# Patient Record
Sex: FEMALE | Race: ASIAN | NOT HISPANIC OR LATINO | Employment: UNEMPLOYED | URBAN - METROPOLITAN AREA
[De-identification: names, ages, dates, MRNs, and addresses within clinical notes are randomized per-mention and may not be internally consistent; named-entity substitution may affect disease eponyms.]

---
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
## 2022-02-11 ENCOUNTER — LAB REQUISITION (OUTPATIENT)
Dept: LAB | Facility: CLINIC | Age: 22
End: 2022-02-11

## 2022-02-11 DIAGNOSIS — R30.0 DYSURIA: ICD-10-CM

## 2022-02-11 LAB
ALBUMIN UR-MCNC: NEGATIVE MG/DL
APPEARANCE UR: CLEAR
BACTERIA #/AREA URNS HPF: ABNORMAL /HPF
BILIRUB UR QL STRIP: NEGATIVE
COLOR UR AUTO: ABNORMAL
GLUCOSE UR STRIP-MCNC: NEGATIVE MG/DL
HGB UR QL STRIP: ABNORMAL
KETONES UR STRIP-MCNC: NEGATIVE MG/DL
LEUKOCYTE ESTERASE UR QL STRIP: NEGATIVE
NITRATE UR QL: NEGATIVE
PH UR STRIP: 6.5 [PH] (ref 5–7)
RBC URINE: 1 /HPF
SP GR UR STRIP: 1.01 (ref 1–1.03)
SQUAMOUS EPITHELIAL: <1 /HPF
UROBILINOGEN UR STRIP-MCNC: <2 MG/DL
WBC URINE: 1 /HPF

## 2022-02-11 PROCEDURE — 87186 SC STD MICRODIL/AGAR DIL: CPT | Performed by: FAMILY MEDICINE

## 2022-02-11 PROCEDURE — 81001 URINALYSIS AUTO W/SCOPE: CPT | Performed by: FAMILY MEDICINE

## 2022-02-14 LAB — BACTERIA UR CULT: ABNORMAL

## 2022-08-04 ENCOUNTER — LAB (OUTPATIENT)
Dept: LAB | Facility: HOSPITAL | Age: 22
End: 2022-08-04
Payer: COMMERCIAL

## 2022-08-04 DIAGNOSIS — H30.91 RIGHT POSTERIOR UVEITIS: Primary | ICD-10-CM

## 2022-08-04 LAB
BASOPHILS # BLD AUTO: 0.1 10E3/UL (ref 0–0.2)
BASOPHILS NFR BLD AUTO: 1 %
C REACTIVE PROTEIN LHE: <0.1 MG/DL (ref 0–?)
EOSINOPHIL # BLD AUTO: 0.3 10E3/UL (ref 0–0.7)
EOSINOPHIL NFR BLD AUTO: 5 %
ERYTHROCYTE [DISTWIDTH] IN BLOOD BY AUTOMATED COUNT: 12.3 % (ref 10–15)
ERYTHROCYTE [SEDIMENTATION RATE] IN BLOOD BY WESTERGREN METHOD: 13 MM/HR (ref 0–20)
HCT VFR BLD AUTO: 42.8 % (ref 35–47)
HGB BLD-MCNC: 14.2 G/DL (ref 11.7–15.7)
IMM GRANULOCYTES # BLD: 0 10E3/UL
IMM GRANULOCYTES NFR BLD: 0 %
LYMPHOCYTES # BLD AUTO: 2.1 10E3/UL (ref 0.8–5.3)
LYMPHOCYTES NFR BLD AUTO: 33 %
MCH RBC QN AUTO: 30.4 PG (ref 26.5–33)
MCHC RBC AUTO-ENTMCNC: 33.2 G/DL (ref 31.5–36.5)
MCV RBC AUTO: 92 FL (ref 78–100)
MONOCYTES # BLD AUTO: 0.8 10E3/UL (ref 0–1.3)
MONOCYTES NFR BLD AUTO: 12 %
NEUTROPHILS # BLD AUTO: 3.2 10E3/UL (ref 1.6–8.3)
NEUTROPHILS NFR BLD AUTO: 49 %
NRBC # BLD AUTO: 0 10E3/UL
NRBC BLD AUTO-RTO: 0 /100
PLATELET # BLD AUTO: 284 10E3/UL (ref 150–450)
RBC # BLD AUTO: 4.67 10E6/UL (ref 3.8–5.2)
WBC # BLD AUTO: 6.5 10E3/UL (ref 4–11)

## 2022-08-04 PROCEDURE — 86777 TOXOPLASMA ANTIBODY: CPT

## 2022-08-04 PROCEDURE — 86038 ANTINUCLEAR ANTIBODIES: CPT

## 2022-08-04 PROCEDURE — 86780 TREPONEMA PALLIDUM: CPT

## 2022-08-04 PROCEDURE — 82164 ANGIOTENSIN I ENZYME TEST: CPT

## 2022-08-04 PROCEDURE — 86481 TB AG RESPONSE T-CELL SUSP: CPT

## 2022-08-04 PROCEDURE — 86611 BARTONELLA ANTIBODY: CPT

## 2022-08-04 PROCEDURE — 85025 COMPLETE CBC W/AUTO DIFF WBC: CPT

## 2022-08-04 PROCEDURE — 81374 HLA I TYPING 1 ANTIGEN LR: CPT

## 2022-08-04 PROCEDURE — 85549 MURAMIDASE: CPT

## 2022-08-04 PROCEDURE — 86140 C-REACTIVE PROTEIN: CPT

## 2022-08-04 PROCEDURE — 36415 COLL VENOUS BLD VENIPUNCTURE: CPT

## 2022-08-04 PROCEDURE — 86036 ANCA SCREEN EACH ANTIBODY: CPT

## 2022-08-04 PROCEDURE — 86431 RHEUMATOID FACTOR QUANT: CPT

## 2022-08-04 PROCEDURE — 85652 RBC SED RATE AUTOMATED: CPT

## 2022-08-04 PROCEDURE — 86618 LYME DISEASE ANTIBODY: CPT

## 2022-08-05 LAB
B BURGDOR IGG+IGM SER QL: 0.3
RHEUMATOID FACT SER NEPH-ACNC: <6 IU/ML
T GONDII IGG SER QL: 84.7 IU/ML (ref 0–7.1)
T PALLIDUM AB SER QL: NONREACTIVE

## 2022-08-06 LAB
ACE SERPL-CCNC: 48 U/L
QUANTIFERON MITOGEN: 10 IU/ML
QUANTIFERON NIL TUBE: 0.32 IU/ML
QUANTIFERON TB1 TUBE: 0.17 IU/ML
QUANTIFERON TB2 TUBE: 0.46

## 2022-08-07 LAB
GAMMA INTERFERON BACKGROUND BLD IA-ACNC: 0.32 IU/ML
M TB IFN-G BLD-IMP: NEGATIVE
M TB IFN-G CD4+ BCKGRND COR BLD-ACNC: 9.68 IU/ML
MITOGEN IGNF BCKGRD COR BLD-ACNC: -0.15 IU/ML
MITOGEN IGNF BCKGRD COR BLD-ACNC: 0.14 IU/ML

## 2022-08-08 LAB
ANA SER QL IF: NEGATIVE
B HENSELAE IGG TITR SER IF: NORMAL {TITER}
B HENSELAE IGM TITR SER IF: NORMAL {TITER}

## 2022-08-09 LAB
ANCA AB PATTERN SER IF-IMP: NORMAL
C-ANCA TITR SER IF: NORMAL {TITER}

## 2022-08-10 LAB
B LOCUS: NORMAL
B27TEST METHOD: NORMAL

## 2022-08-11 LAB — LYSOZYME SERPL-MCNC: 1.66 UG/ML

## 2022-11-10 ENCOUNTER — LAB REQUISITION (OUTPATIENT)
Dept: LAB | Facility: CLINIC | Age: 22
End: 2022-11-10

## 2022-11-10 DIAGNOSIS — Z32.01 ENCOUNTER FOR PREGNANCY TEST, RESULT POSITIVE: ICD-10-CM

## 2022-11-10 LAB
BASOPHILS # BLD AUTO: 0.1 10E3/UL (ref 0–0.2)
BASOPHILS NFR BLD AUTO: 1 %
EOSINOPHIL # BLD AUTO: 0.2 10E3/UL (ref 0–0.7)
EOSINOPHIL NFR BLD AUTO: 1 %
ERYTHROCYTE [DISTWIDTH] IN BLOOD BY AUTOMATED COUNT: 12.3 % (ref 10–15)
HCT VFR BLD AUTO: 40.2 % (ref 35–47)
HGB BLD-MCNC: 13.3 G/DL (ref 11.7–15.7)
IMM GRANULOCYTES # BLD: 0.1 10E3/UL
IMM GRANULOCYTES NFR BLD: 0 %
LYMPHOCYTES # BLD AUTO: 2 10E3/UL (ref 0.8–5.3)
LYMPHOCYTES NFR BLD AUTO: 17 %
MCH RBC QN AUTO: 30.3 PG (ref 26.5–33)
MCHC RBC AUTO-ENTMCNC: 33.1 G/DL (ref 31.5–36.5)
MCV RBC AUTO: 92 FL (ref 78–100)
MONOCYTES # BLD AUTO: 0.8 10E3/UL (ref 0–1.3)
MONOCYTES NFR BLD AUTO: 7 %
NEUTROPHILS # BLD AUTO: 8.5 10E3/UL (ref 1.6–8.3)
NEUTROPHILS NFR BLD AUTO: 74 %
NRBC # BLD AUTO: 0 10E3/UL
NRBC BLD AUTO-RTO: 0 /100
PLATELET # BLD AUTO: 308 10E3/UL (ref 150–450)
RBC # BLD AUTO: 4.39 10E6/UL (ref 3.8–5.2)
WBC # BLD AUTO: 11.5 10E3/UL (ref 4–11)

## 2022-11-10 PROCEDURE — 86803 HEPATITIS C AB TEST: CPT | Performed by: NURSE PRACTITIONER

## 2022-11-10 PROCEDURE — 86901 BLOOD TYPING SEROLOGIC RH(D): CPT | Performed by: NURSE PRACTITIONER

## 2022-11-10 PROCEDURE — 86780 TREPONEMA PALLIDUM: CPT | Performed by: NURSE PRACTITIONER

## 2022-11-10 PROCEDURE — 85014 HEMATOCRIT: CPT | Performed by: NURSE PRACTITIONER

## 2022-11-10 PROCEDURE — 86762 RUBELLA ANTIBODY: CPT | Performed by: NURSE PRACTITIONER

## 2022-11-10 PROCEDURE — 87088 URINE BACTERIA CULTURE: CPT | Performed by: NURSE PRACTITIONER

## 2022-11-10 PROCEDURE — 87491 CHLMYD TRACH DNA AMP PROBE: CPT | Performed by: NURSE PRACTITIONER

## 2022-11-10 PROCEDURE — 87389 HIV-1 AG W/HIV-1&-2 AB AG IA: CPT | Performed by: NURSE PRACTITIONER

## 2022-11-10 PROCEDURE — 87340 HEPATITIS B SURFACE AG IA: CPT | Performed by: NURSE PRACTITIONER

## 2022-11-11 LAB
ABO/RH(D): NORMAL
ANTIBODY SCREEN: NEGATIVE
C TRACH DNA SPEC QL PROBE+SIG AMP: NEGATIVE
HBV SURFACE AG SERPL QL IA: NONREACTIVE
HCV AB SERPL QL IA: NONREACTIVE
HIV 1+2 AB+HIV1 P24 AG SERPL QL IA: NONREACTIVE
N GONORRHOEA DNA SPEC QL NAA+PROBE: NEGATIVE
RUBV IGG SERPL QL IA: 14.3 INDEX
RUBV IGG SERPL QL IA: POSITIVE
SPECIMEN EXPIRATION DATE: NORMAL
T PALLIDUM AB SER QL: NONREACTIVE

## 2022-11-13 LAB — BACTERIA UR CULT: ABNORMAL

## 2022-12-30 ENCOUNTER — LAB REQUISITION (OUTPATIENT)
Dept: LAB | Facility: CLINIC | Age: 22
End: 2022-12-30

## 2022-12-30 DIAGNOSIS — Z34.01 ENCOUNTER FOR SUPERVISION OF NORMAL FIRST PREGNANCY, FIRST TRIMESTER: ICD-10-CM

## 2022-12-30 PROCEDURE — G0145 SCR C/V CYTO,THINLAYER,RESCR: HCPCS | Performed by: FAMILY MEDICINE

## 2023-01-03 LAB
BKR LAB AP GYN ADEQUACY: NORMAL
BKR LAB AP GYN INTERPRETATION: NORMAL
BKR LAB AP HPV REFLEX: NORMAL
BKR LAB AP LMP: NORMAL
BKR LAB AP PREVIOUS ABNORMAL: NORMAL
PATH REPORT.COMMENTS IMP SPEC: NORMAL
PATH REPORT.COMMENTS IMP SPEC: NORMAL
PATH REPORT.RELEVANT HX SPEC: NORMAL

## 2023-03-31 ENCOUNTER — LAB REQUISITION (OUTPATIENT)
Dept: LAB | Facility: CLINIC | Age: 23
End: 2023-03-31

## 2023-03-31 DIAGNOSIS — Z34.00 ENCOUNTER FOR SUPERVISION OF NORMAL FIRST PREGNANCY, UNSPECIFIED TRIMESTER: ICD-10-CM

## 2023-03-31 LAB — HGB BLD-MCNC: 12.3 G/DL (ref 11.7–15.7)

## 2023-03-31 PROCEDURE — 86780 TREPONEMA PALLIDUM: CPT | Performed by: FAMILY MEDICINE

## 2023-03-31 PROCEDURE — 85018 HEMOGLOBIN: CPT | Performed by: FAMILY MEDICINE

## 2023-04-01 LAB — T PALLIDUM AB SER QL: NONREACTIVE

## 2023-05-16 ENCOUNTER — LAB REQUISITION (OUTPATIENT)
Dept: LAB | Facility: CLINIC | Age: 23
End: 2023-05-16

## 2023-05-16 ENCOUNTER — TRANSFERRED RECORDS (OUTPATIENT)
Dept: HEALTH INFORMATION MANAGEMENT | Facility: CLINIC | Age: 23
End: 2023-05-16
Payer: COMMERCIAL

## 2023-05-16 DIAGNOSIS — Z34.00 ENCOUNTER FOR SUPERVISION OF NORMAL FIRST PREGNANCY, UNSPECIFIED TRIMESTER: ICD-10-CM

## 2023-05-16 PROCEDURE — 87653 STREP B DNA AMP PROBE: CPT | Performed by: FAMILY MEDICINE

## 2023-05-18 LAB — GP B STREP DNA SPEC QL NAA+PROBE: NEGATIVE

## 2023-06-03 ENCOUNTER — HOSPITAL ENCOUNTER (INPATIENT)
Facility: HOSPITAL | Age: 23
LOS: 1 days | Discharge: HOME | End: 2023-06-05
Attending: FAMILY MEDICINE | Admitting: FAMILY MEDICINE
Payer: COMMERCIAL

## 2023-06-03 ENCOUNTER — HOSPITAL ENCOUNTER (OUTPATIENT)
Facility: HOSPITAL | Age: 23
Discharge: HOME | End: 2023-06-03
Attending: FAMILY MEDICINE | Admitting: FAMILY MEDICINE
Payer: COMMERCIAL

## 2023-06-03 ENCOUNTER — HOSPITAL ENCOUNTER (OUTPATIENT)
Facility: HOSPITAL | Age: 23
End: 2023-06-03
Admitting: FAMILY MEDICINE

## 2023-06-03 VITALS — RESPIRATION RATE: 16 BRPM | SYSTOLIC BLOOD PRESSURE: 119 MMHG | DIASTOLIC BLOOD PRESSURE: 84 MMHG | TEMPERATURE: 97.8 F

## 2023-06-03 PROBLEM — Z36.89 ENCOUNTER FOR TRIAGE IN PREGNANT PATIENT: Status: ACTIVE | Noted: 2023-06-03

## 2023-06-03 PROBLEM — O47.9 UTERINE CONTRACTIONS: Status: ACTIVE | Noted: 2023-06-03

## 2023-06-03 PROCEDURE — 96372 THER/PROPH/DIAG INJ SC/IM: CPT | Performed by: FAMILY MEDICINE

## 2023-06-03 PROCEDURE — G0463 HOSPITAL OUTPT CLINIC VISIT: HCPCS

## 2023-06-03 PROCEDURE — 250N000011 HC RX IP 250 OP 636: Performed by: FAMILY MEDICINE

## 2023-06-03 PROCEDURE — 250N000013 HC RX MED GY IP 250 OP 250 PS 637: Performed by: FAMILY MEDICINE

## 2023-06-03 RX ORDER — ONDANSETRON 4 MG/1
4 TABLET, ORALLY DISINTEGRATING ORAL EVERY 6 HOURS PRN
Status: DISCONTINUED | OUTPATIENT
Start: 2023-06-03 | End: 2023-06-04 | Stop reason: HOSPADM

## 2023-06-03 RX ORDER — PROCHLORPERAZINE MALEATE 10 MG
10 TABLET ORAL EVERY 6 HOURS PRN
Status: DISCONTINUED | OUTPATIENT
Start: 2023-06-03 | End: 2023-06-04 | Stop reason: HOSPADM

## 2023-06-03 RX ORDER — ONDANSETRON 2 MG/ML
4 INJECTION INTRAMUSCULAR; INTRAVENOUS EVERY 6 HOURS PRN
Status: DISCONTINUED | OUTPATIENT
Start: 2023-06-03 | End: 2023-06-04 | Stop reason: HOSPADM

## 2023-06-03 RX ORDER — HYDROXYZINE HYDROCHLORIDE 50 MG/1
50-100 TABLET, FILM COATED ORAL
Status: DISPENSED | OUTPATIENT
Start: 2023-06-03 | End: 2023-06-04

## 2023-06-03 RX ORDER — METOCLOPRAMIDE HYDROCHLORIDE 5 MG/ML
10 INJECTION INTRAMUSCULAR; INTRAVENOUS EVERY 6 HOURS PRN
Status: DISCONTINUED | OUTPATIENT
Start: 2023-06-03 | End: 2023-06-04 | Stop reason: HOSPADM

## 2023-06-03 RX ORDER — PROCHLORPERAZINE 25 MG
25 SUPPOSITORY, RECTAL RECTAL EVERY 12 HOURS PRN
Status: DISCONTINUED | OUTPATIENT
Start: 2023-06-03 | End: 2023-06-04 | Stop reason: HOSPADM

## 2023-06-03 RX ORDER — LIDOCAINE 40 MG/G
CREAM TOPICAL
Status: DISCONTINUED | OUTPATIENT
Start: 2023-06-03 | End: 2023-06-03 | Stop reason: HOSPADM

## 2023-06-03 RX ORDER — METOCLOPRAMIDE 10 MG/1
10 TABLET ORAL EVERY 6 HOURS PRN
Status: DISCONTINUED | OUTPATIENT
Start: 2023-06-03 | End: 2023-06-04 | Stop reason: HOSPADM

## 2023-06-03 RX ORDER — MORPHINE SULFATE 10 MG/ML
10 INJECTION, SOLUTION INTRAMUSCULAR; INTRAVENOUS ONCE
Status: COMPLETED | OUTPATIENT
Start: 2023-06-03 | End: 2023-06-03

## 2023-06-03 RX ADMIN — HYDROXYZINE HYDROCHLORIDE 100 MG: 50 TABLET, FILM COATED ORAL at 23:52

## 2023-06-03 RX ADMIN — MORPHINE SULFATE 10 MG: 10 INJECTION INTRAVENOUS at 23:52

## 2023-06-03 ASSESSMENT — ACTIVITIES OF DAILY LIVING (ADL)
ADLS_ACUITY_SCORE: 35

## 2023-06-03 NOTE — PROGRESS NOTES
"Data: Patient assessed in the [facility] for uterine contractions. Cervix 3 cm dilated and 70% effaced. Fetal station -1. no cervical change in over 2 hours. Membranes intact. Patient reports decrease in frequency of contractions since hydrating in hospital.  She reports 2 contractions in last 45 minutes. See flowsheets for fetal assessment documentation.     Action:  Presumed adequate fetal oxygenation documented. Early labor precautions and discharge instructions reviewed, including when to notify provider if warning signs present. Patient instructed to report decrease in fetal movement, vaginal bleeding, changes in membrane status, abdominal pain, or any concerns related to the pregnancy to patient's provider/clinic.     Response: Orders to discharge home per Dr. Zurita. Plan for patient is to re-evaluate labor status by following up with provider at next scheduled appointment, or sooner in hospital as needed. Patient verbalized understanding of education and agreement with plan. \"I'm ready to go home and take a nap.\" Discharged to home at 1345.                                 "

## 2023-06-03 NOTE — PROGRESS NOTES
Patient is GBS negative and blood type O positive per Dr. Allan. Will get a prenatal record from Dr. Allan when able.

## 2023-06-03 NOTE — DISCHARGE INSTRUCTIONS
EARLY LABOR DISCHARGE INSTRUCTIONS    You were seen for: Labor Assessment  You had (Test or Medicine): fetal monitoring and cervical exams    Refer to Any Day Now handout for tips on how to labor at home    WHEN TO CALL YOUR PROVIDER:  If this is your first baby: Your contractions (tightening) are 5 minutes apart, last more than 1 minute, and have been consistently getting stronger for 1 hour or more  If this is your second baby or beyond: Your contractions are less than 10 minutes apart and have been consistently getting stronger for 1 hour or more  Feeling your baby move less than usual  Temperature of 100.4 F (38 C) or higher  New fluid leaking from your vagina  Other signs your provider asked you to look for in your body  Vaginal bleeding (bright red blood)  Swelling in your face or more swelling in your hands or legs  Headaches that don't get better after taking Tylenol (acetaminophen)  Changes in your vision (blurry or seeing spots or stars)  Nausea (sick to your stomach) and vomiting (throwing up)  Heartburn that doesn't go away  Sudden, bad belly pain that is unlike your contractions    IF YOU ARRIVED WITH YOUR WATER ALREADY BROKEN (MEMBRANES RUPTURED):  Avoid placing anything in vagina, including intercourse (sex)  Check your temperature every 3 hours when awake  Call your provider/clinic if:  Your temperature is 100.4 F (38 C) or higher  Your fluid becomes not clear or is smelly  Your baby is moving less than usual  You don't go into labor within 24 hours of your water breaking  You have other concerns  Follow up plan: At next scheduled appointment or sooner in hospital for labor evaluation      FOLLOW UP:  As scheduled in the clinic    Provider/clinic number: [phone] [facility]      Any Day Now  Your guide to early labor at home  Congratulations; you're in early labor! This is an early stage of labor that helps prepare your body for active labor--the phase when you finally meet your baby.  "  About two-thirds of your entire labor (about 66%) will be in this early stage of labor. If this is your first time in labor, this phase may last 20 hours or more. It may be shorter for people who have been in labor before.   What should I do?  We understand that you have been waiting a long time to meet your baby and that waiting a bit more seems like forever. We suggest spending your early labor at home. Here's why:  You can be comfortable in your own surroundings.  You can relax, which will help your labor progress.  It may make the time seem to go by faster.  Together, we will create a plan for when to come to the hospital or follow-up with your health care provider.  We realize that this can be a time of uncertainty, anxiety, and mixed emotions (on top of not being very restful). We hope the suggestions in this document will make your early labor a bit more comfortable and may even help speed up the process.  What should I know about early labor?  Early labor is the first stage of labor. In this stage, your uterus begins having contractions to prepare for childbirth. When you have a contraction, the muscles of the uterus tighten and relax. Contractions help your cervix to thin and shorten (efface) and open (dilate) so your baby can be born.   Labor contractions increase steadily over time. They become stronger and more frequent until they are happening every 5 minutes or more.  You may have already had some \"practice\" contractions called [place]. While these contractions may be strong and sometimes uncomfortable, they are not true labor contractions. [place] contractions don't help your cervix dilate the way that labor contractions do  Words you'll hear  Cervix - the opening to the uterus. The cervix must be fully dilated (open) for your baby to be born. The cervix is in the back of the vagina.  Contractions - when the muscles of the uterus tighten and relax.   Dilated -the openness of your cervix; " it is measured in centimeters from 0 to 10.  Effaced - shortening and thinning of the cervix; it is measured from 0 to 100%.   Uterus - where your baby is located.   How does labor typcally progress?  The amount of dilation in your cervix lets us know how close you are to delivery. Labor often progresses like this:  0 centimeters: The cervix is closed.  1 to 5 centimeters: You start having regular contractions to dilate your cervix (early labor). This slowly prepares your body for childbirth.  6 centimeters: This is when active labor begins. Contractions happen in a regular pattern that increases steadily over time.  10 centimeters: Your cervix is completely dilated. You are ready to start pushing.     What can I do to help my labor progress?  Diet  It is important to stay hydrated, especially with water, broths, ice pops (Popsicles).  Eat light snacks or meals you find comforting.  As your body transitions into active labor, you will likely be less hungry. You may also feel nauseous (sick to your stomach). Eat light meals that you feel you can tolerate.  Keep your energy up with good nutrition, including fruits and vegetables.  Activity  It is important to rest as your body allows. Talk to your health care team if you find it hard to sleep or rest.  You should continue to feel your baby move. You can expect to feel 10 movements each hour.  If your water breaks, avoid sexual intercourse or putting anything into your vagina.  Positions to try  Changing your position often may help your labor to progress and feel more comfortable.  Abdominal tilt     Tilt your hips forward. Use your hands to gently lift your belly (or ask a support person). By lifting your belly and tilting your hips forward, you are creating a straighter line for your baby to come down into your pelvis.   Side-lying resting     Lie on your side and pull your top leg up and over to a 90-degree angle, if possible. This allows a comfortable position for  you to rest and your pelvis to open up. Try this position on your left side, and then your right side.   Exercise/birthing ball     A large exercise ball gives you a break from standing, plus it allows movement and lets gravity do its job. Rocking or swaying on the ball allows you to be upright so your baby can come down into your pelvis.   Hands and knees  You can also try positioning yourself on your hands and knees, with or without the ball for support.     How can my support person help me?  Talk to your support person ahead of time about ways to help you during labor. For example, your support person can:  Review different positions and comfort measures with you ahead of time. This will help you feel more prepared.  Make you healthy snacks ahead of time so you can keep your energy up.  Encourage you through breathing and relaxation techniques. This will help you stay focused.  Finding comfort during labor  Some ideas to help refocus, calm anxiety, and relax tense muscles:  Listen to soft music, watch a movie, or visit social medial (such as [employer]).  Use meditation or guided imagery to create pictures or stories in your mind.  Walk around.  Take a warm bath or shower.  Try rhythmic movement, like slow dancing or using a rocking chair.  Try massaging touch to your comfort level on the hips, lower back or feet. You can also try different pressure levels (firm pressure is safe) or vibration.  Deep breathing (may be called the 4-7-8 technique, square breathing, or relaxing breath).  Aromatherapy (peppermint, lavender and citrus are great choices for nausea and relaxation).  Wear a belly support band.  Wrap a hot/cold pack in a towel to protect your skin. Apply cold packs to your lower back or pulse points. Or, use heat on your lower back. Leave the hot/cold pack on for 30 minutes or less.  For helpful videos on comfort during labor, please visit https://[email or web address].com/category-pain-management-series.  How  will I know when I'm in active labor?  Start timing your contractions when they become stronger or more intense. Time your contractions from the start of one contraction until the start of another.  Signs of active labor  If this is your first baby:  Your contractions are 5 minutes apart; and  Last more than 1 minute; and  Have been consistently getting stronger   for 1 hour or more.  If this is your second baby or beyond:  Your contractions are less than 10 minutes apart; and  Have been consistently getting stronger   for 1 hour or more.  When to call your provider:  Call your provider right away if you have any of these issues:  You have any signs of active labor previously listed.  Bright red bleeding in your underwear.  You think your water has broken.  Your temperature 100.4 F (38 C) or higher.  If you are less than 34 weeks:  More than 6 contractions in one hour  Follow-up visits  Please keep your regularly scheduled clinic appointment with your pregnancy provider.  For informational purposes only. Not to replace the advice of your health care provider. Cervical effacement and dilation image ID [id number]   Luna Contreras  [employer]. All rights reserved. Text and other images copyright   2022 [employer]. All rights reserved. Clinically reviewed by the Safe Vaginal Birth Steering Team. [place] [id number] - 03/23.

## 2023-06-03 NOTE — PROGRESS NOTES
Data: [facility] presented [facility]: 6/3/2023 10:20 AM.  Reason for maternal/fetal assessment is uterine contractions. Patient reports contractions every 7 minutes since around 0800. Patient denies leaking of vaginal fluid/rupture of membranes, vaginal bleeding, nausea, vomiting, headache, visual disturbances, epigastric or RUQ pain, significant edema. Patient reports fetal movement is normal. Patient is a Unknown . Prenatal record reviewed. Pregnancy has been uncomplicated. Support person Cuate Grullon -  -  is present.     Fetal HR baseline was 135 , variability is moderate  . Accelerations: present  . Decelerations: absent  . Uterine assessment is  Soft between contractions and during palpates moderate during contractions. Cervix 3 cm dilated and 70% effaced. Fetal station -1. Fetal presentation vertex  per cervical exam. Membranes: intact.    Vital signs wnl. Patient reports pain and is coping.     Action: Verbal consent for EFM. Triage assessment completed. Patient may meet criteria for early labor discharge.     Response: Patient verbalized understanding of triage assessment. Dr. Allan contacted with assessment and consideration of early labor discharge vs admission. Ok per Dr. Allan to offer patient early labor discharge or continuing to monitor labor here.  Patient chooses to stay and recheck cervix in a couple of hours.  Dr. Allan updated. Category 1 tracing.  OK to continue with intermittent auscultation per policy at this time.  Patient encouraged to explore different upright positions for progression of labor.  Comfortable on the birthing ball.  PO fluids encouraged.  Educated on call light.  No questions or concerns at this time.

## 2023-06-04 ENCOUNTER — APPOINTMENT (OUTPATIENT)
Dept: RADIOLOGY | Facility: HOSPITAL | Age: 23
End: 2023-06-04
Attending: FAMILY MEDICINE
Payer: COMMERCIAL

## 2023-06-04 PROBLEM — Z34.90 PREGNANT: Status: ACTIVE | Noted: 2023-06-04

## 2023-06-04 LAB
ABO/RH(D): NORMAL
ANTIBODY SCREEN: NEGATIVE
HOLD SPECIMEN: NORMAL
SPECIMEN EXPIRATION DATE: NORMAL
T PALLIDUM AB SER QL: NONREACTIVE

## 2023-06-04 PROCEDURE — 120N000001 HC R&B MED SURG/OB

## 2023-06-04 PROCEDURE — 250N000009 HC RX 250: Performed by: FAMILY MEDICINE

## 2023-06-04 PROCEDURE — 86850 RBC ANTIBODY SCREEN: CPT | Performed by: FAMILY MEDICINE

## 2023-06-04 PROCEDURE — 250N000011 HC RX IP 250 OP 636: Performed by: FAMILY MEDICINE

## 2023-06-04 PROCEDURE — 86780 TREPONEMA PALLIDUM: CPT | Performed by: FAMILY MEDICINE

## 2023-06-04 PROCEDURE — 72170 X-RAY EXAM OF PELVIS: CPT

## 2023-06-04 PROCEDURE — 36415 COLL VENOUS BLD VENIPUNCTURE: CPT | Performed by: FAMILY MEDICINE

## 2023-06-04 PROCEDURE — 722N000001 HC LABOR CARE VAGINAL DELIVERY SINGLE

## 2023-06-04 PROCEDURE — 250N000013 HC RX MED GY IP 250 OP 250 PS 637: Performed by: FAMILY MEDICINE

## 2023-06-04 PROCEDURE — 86901 BLOOD TYPING SEROLOGIC RH(D): CPT | Performed by: FAMILY MEDICINE

## 2023-06-04 RX ORDER — MISOPROSTOL 200 UG/1
800 TABLET ORAL
Status: DISCONTINUED | OUTPATIENT
Start: 2023-06-04 | End: 2023-06-04 | Stop reason: HOSPADM

## 2023-06-04 RX ORDER — NALOXONE HYDROCHLORIDE 0.4 MG/ML
0.2 INJECTION, SOLUTION INTRAMUSCULAR; INTRAVENOUS; SUBCUTANEOUS
Status: DISCONTINUED | OUTPATIENT
Start: 2023-06-04 | End: 2023-06-04 | Stop reason: HOSPADM

## 2023-06-04 RX ORDER — MISOPROSTOL 200 UG/1
800 TABLET ORAL
Status: DISCONTINUED | OUTPATIENT
Start: 2023-06-04 | End: 2023-06-05 | Stop reason: HOSPADM

## 2023-06-04 RX ORDER — PROCHLORPERAZINE MALEATE 10 MG
10 TABLET ORAL EVERY 6 HOURS PRN
Status: DISCONTINUED | OUTPATIENT
Start: 2023-06-04 | End: 2023-06-04 | Stop reason: HOSPADM

## 2023-06-04 RX ORDER — ACETAMINOPHEN 325 MG/1
650 TABLET ORAL EVERY 4 HOURS PRN
Status: DISCONTINUED | OUTPATIENT
Start: 2023-06-04 | End: 2023-06-05 | Stop reason: HOSPADM

## 2023-06-04 RX ORDER — METOCLOPRAMIDE HYDROCHLORIDE 5 MG/ML
10 INJECTION INTRAMUSCULAR; INTRAVENOUS EVERY 6 HOURS PRN
Status: DISCONTINUED | OUTPATIENT
Start: 2023-06-04 | End: 2023-06-04 | Stop reason: HOSPADM

## 2023-06-04 RX ORDER — OXYTOCIN 10 [USP'U]/ML
10 INJECTION, SOLUTION INTRAMUSCULAR; INTRAVENOUS
Status: DISCONTINUED | OUTPATIENT
Start: 2023-06-04 | End: 2023-06-05 | Stop reason: HOSPADM

## 2023-06-04 RX ORDER — OXYTOCIN/0.9 % SODIUM CHLORIDE 30/500 ML
100-340 PLASTIC BAG, INJECTION (ML) INTRAVENOUS CONTINUOUS PRN
Status: DISCONTINUED | OUTPATIENT
Start: 2023-06-04 | End: 2023-06-05 | Stop reason: HOSPADM

## 2023-06-04 RX ORDER — OXYTOCIN/0.9 % SODIUM CHLORIDE 30/500 ML
340 PLASTIC BAG, INJECTION (ML) INTRAVENOUS CONTINUOUS PRN
Status: DISCONTINUED | OUTPATIENT
Start: 2023-06-04 | End: 2023-06-05 | Stop reason: HOSPADM

## 2023-06-04 RX ORDER — NALOXONE HYDROCHLORIDE 0.4 MG/ML
0.4 INJECTION, SOLUTION INTRAMUSCULAR; INTRAVENOUS; SUBCUTANEOUS
Status: DISCONTINUED | OUTPATIENT
Start: 2023-06-04 | End: 2023-06-04 | Stop reason: HOSPADM

## 2023-06-04 RX ORDER — ONDANSETRON 2 MG/ML
4 INJECTION INTRAMUSCULAR; INTRAVENOUS EVERY 6 HOURS PRN
Status: DISCONTINUED | OUTPATIENT
Start: 2023-06-04 | End: 2023-06-04 | Stop reason: HOSPADM

## 2023-06-04 RX ORDER — MODIFIED LANOLIN
OINTMENT (GRAM) TOPICAL
Status: DISCONTINUED | OUTPATIENT
Start: 2023-06-04 | End: 2023-06-05 | Stop reason: HOSPADM

## 2023-06-04 RX ORDER — DOCUSATE SODIUM 100 MG/1
100 CAPSULE, LIQUID FILLED ORAL DAILY
Status: DISCONTINUED | OUTPATIENT
Start: 2023-06-04 | End: 2023-06-05 | Stop reason: HOSPADM

## 2023-06-04 RX ORDER — KETOROLAC TROMETHAMINE 30 MG/ML
30 INJECTION, SOLUTION INTRAMUSCULAR; INTRAVENOUS
Status: DISCONTINUED | OUTPATIENT
Start: 2023-06-04 | End: 2023-06-05 | Stop reason: HOSPADM

## 2023-06-04 RX ORDER — CARBOPROST TROMETHAMINE 250 UG/ML
250 INJECTION, SOLUTION INTRAMUSCULAR
Status: DISCONTINUED | OUTPATIENT
Start: 2023-06-04 | End: 2023-06-04 | Stop reason: HOSPADM

## 2023-06-04 RX ORDER — MISOPROSTOL 200 UG/1
400 TABLET ORAL
Status: DISCONTINUED | OUTPATIENT
Start: 2023-06-04 | End: 2023-06-05 | Stop reason: HOSPADM

## 2023-06-04 RX ORDER — HYDROCORTISONE 25 MG/G
CREAM TOPICAL 3 TIMES DAILY PRN
Status: DISCONTINUED | OUTPATIENT
Start: 2023-06-04 | End: 2023-06-05 | Stop reason: HOSPADM

## 2023-06-04 RX ORDER — OXYTOCIN 10 [USP'U]/ML
10 INJECTION, SOLUTION INTRAMUSCULAR; INTRAVENOUS
Status: COMPLETED | OUTPATIENT
Start: 2023-06-04 | End: 2023-06-04

## 2023-06-04 RX ORDER — CARBOPROST TROMETHAMINE 250 UG/ML
250 INJECTION, SOLUTION INTRAMUSCULAR
Status: DISCONTINUED | OUTPATIENT
Start: 2023-06-04 | End: 2023-06-05 | Stop reason: HOSPADM

## 2023-06-04 RX ORDER — PROCHLORPERAZINE 25 MG
25 SUPPOSITORY, RECTAL RECTAL EVERY 12 HOURS PRN
Status: DISCONTINUED | OUTPATIENT
Start: 2023-06-04 | End: 2023-06-04 | Stop reason: HOSPADM

## 2023-06-04 RX ORDER — BISACODYL 10 MG
10 SUPPOSITORY, RECTAL RECTAL DAILY PRN
Status: DISCONTINUED | OUTPATIENT
Start: 2023-06-04 | End: 2023-06-05 | Stop reason: HOSPADM

## 2023-06-04 RX ORDER — METHYLERGONOVINE MALEATE 0.2 MG/ML
200 INJECTION INTRAVENOUS
Status: DISCONTINUED | OUTPATIENT
Start: 2023-06-04 | End: 2023-06-05 | Stop reason: HOSPADM

## 2023-06-04 RX ORDER — CITRIC ACID/SODIUM CITRATE 334-500MG
30 SOLUTION, ORAL ORAL
Status: DISCONTINUED | OUTPATIENT
Start: 2023-06-04 | End: 2023-06-04 | Stop reason: HOSPADM

## 2023-06-04 RX ORDER — METHYLERGONOVINE MALEATE 0.2 MG/ML
200 INJECTION INTRAVENOUS
Status: DISCONTINUED | OUTPATIENT
Start: 2023-06-04 | End: 2023-06-04 | Stop reason: HOSPADM

## 2023-06-04 RX ORDER — METOCLOPRAMIDE 10 MG/1
10 TABLET ORAL EVERY 6 HOURS PRN
Status: DISCONTINUED | OUTPATIENT
Start: 2023-06-04 | End: 2023-06-04 | Stop reason: HOSPADM

## 2023-06-04 RX ORDER — MISOPROSTOL 200 UG/1
400 TABLET ORAL
Status: DISCONTINUED | OUTPATIENT
Start: 2023-06-04 | End: 2023-06-04 | Stop reason: HOSPADM

## 2023-06-04 RX ORDER — IBUPROFEN 800 MG/1
800 TABLET, FILM COATED ORAL EVERY 6 HOURS PRN
Status: DISCONTINUED | OUTPATIENT
Start: 2023-06-04 | End: 2023-06-05 | Stop reason: HOSPADM

## 2023-06-04 RX ORDER — IBUPROFEN 800 MG/1
800 TABLET, FILM COATED ORAL
Status: DISCONTINUED | OUTPATIENT
Start: 2023-06-04 | End: 2023-06-05 | Stop reason: HOSPADM

## 2023-06-04 RX ORDER — ONDANSETRON 4 MG/1
4 TABLET, ORALLY DISINTEGRATING ORAL EVERY 6 HOURS PRN
Status: DISCONTINUED | OUTPATIENT
Start: 2023-06-04 | End: 2023-06-04 | Stop reason: HOSPADM

## 2023-06-04 RX ORDER — FENTANYL CITRATE 50 UG/ML
100 INJECTION, SOLUTION INTRAMUSCULAR; INTRAVENOUS
Status: DISCONTINUED | OUTPATIENT
Start: 2023-06-04 | End: 2023-06-04 | Stop reason: HOSPADM

## 2023-06-04 RX ADMIN — LIDOCAINE HYDROCHLORIDE 20 ML: 10 INJECTION, SOLUTION EPIDURAL; INFILTRATION; INTRACAUDAL; PERINEURAL at 09:01

## 2023-06-04 RX ADMIN — IBUPROFEN 800 MG: 800 TABLET ORAL at 23:11

## 2023-06-04 RX ADMIN — OXYTOCIN 10 UNITS: 10 INJECTION, SOLUTION INTRAMUSCULAR; INTRAVENOUS at 07:59

## 2023-06-04 RX ADMIN — IBUPROFEN 800 MG: 800 TABLET ORAL at 08:54

## 2023-06-04 RX ADMIN — IBUPROFEN 800 MG: 800 TABLET ORAL at 17:24

## 2023-06-04 RX ADMIN — BENZOCAINE AND LEVOMENTHOL: 200; 5 SPRAY TOPICAL at 17:42

## 2023-06-04 RX ADMIN — WITCH HAZEL: 500 SOLUTION RECTAL; TOPICAL at 17:42

## 2023-06-04 RX ADMIN — DOCUSATE SODIUM 100 MG: 100 CAPSULE, LIQUID FILLED ORAL at 11:30

## 2023-06-04 ASSESSMENT — ACTIVITIES OF DAILY LIVING (ADL)
ADLS_ACUITY_SCORE: 18
ADLS_ACUITY_SCORE: 18
WALKING_OR_CLIMBING_STAIRS_DIFFICULTY: NO
ADLS_ACUITY_SCORE: 18
HEARING_DIFFICULTY_OR_DEAF: NO
CONCENTRATING,_REMEMBERING_OR_MAKING_DECISIONS_DIFFICULTY: NO
FALL_HISTORY_WITHIN_LAST_SIX_MONTHS: NO
WEAR_GLASSES_OR_BLIND: NO
ADLS_ACUITY_SCORE: 18
DOING_ERRANDS_INDEPENDENTLY_DIFFICULTY: NO
ADLS_ACUITY_SCORE: 18
ADLS_ACUITY_SCORE: 19
DRESSING/BATHING_DIFFICULTY: NO
DIFFICULTY_EATING/SWALLOWING: NO
ADLS_ACUITY_SCORE: 18
ADLS_ACUITY_SCORE: 18
TOILETING_ISSUES: NO
ADLS_ACUITY_SCORE: 35
ADLS_ACUITY_SCORE: 19
ADLS_ACUITY_SCORE: 18
CHANGE_IN_FUNCTIONAL_STATUS_SINCE_ONSET_OF_CURRENT_ILLNESS/INJURY: NO
ADLS_ACUITY_SCORE: 19
DIFFICULTY_COMMUNICATING: NO

## 2023-06-04 NOTE — PLAN OF CARE
Problem: Plan of Care - These are the overarching goals to be used throughout the patient stay.    Goal: Plan of Care Review  Description: The Plan of Care Review/Shift note should be completed every shift.  The Outcome Evaluation is a brief statement about your assessment that the patient is improving, declining, or no change.  This information will be displayed automatically on your shift note.  Outcome: Progressing     Problem: Postpartum (Vaginal Delivery)  Goal: Optimal Pain Control and Function  Outcome: Progressing  Intervention: Prevent or Manage Pain    Progressing well. Vital signs stable and afebrile. Voided x1 post delivery. Ambulates to bathroom and around room with stand by assist. Fundus firm, light bleeding. No clots noted. Both breastfeeding and formula feeding baby. Reports exhaustion and plans to take a nap this afternoon.

## 2023-06-04 NOTE — L&D DELIVERY NOTE
OB Vaginal Delivery Note    Yefri Rodriguez MRN# [id number]   Age: 23 year old YOB: 2000       GA: 39w4d  GP:   Labor Complications: None   EBL:   mL  Delivery QBL:    Delivery Type: Vaginal, Spontaneous   ROM to Delivery Time: (Delivered) Hours: 3 Minutes: 56  [place] Weight: 3.03 kg (6 lb 10.9 oz)    1 Minute 5 Minute 10 Minute   Apgar Totals: 8   9        NIKHIL SORENSEN;KANDI COBB     Delivery Details:  Yefri Rodriguez, a 23 year old  female delivered a viable infant with apgars of 8  and 9 . Patient was fully dilated and pushing after   hours   minutes in active labor. Delivery was via vaginal, spontaneous  to a sterile field under none  anesthesia. Infant delivered in vertex  left  occiput  anterior  position. Anterior and posterior shoulders delivered without difficulty. The cord was around the baby's neck, and was delivered through, and another body cord was noted.   clamped, cut twice and 3 vessels  were noted. Cord blood was obtained in routine fashion with the following disposition: lab .      Cord complications: none   Placenta delivered at 2023  8:01 AM . Placental disposition was Hospital disposal . Fundal massage performed and fundus found to be firm.     Episiotomy: none    Perineum, vagina, cervix were inspected, and the following lacerations were noted:   Perineal lacerations: 1st     labial laceration: right              Any lacerations were repaired in the usual fashion using 3-0 vicryl.  The noted bilateral periurethral's were not repaired due to excellent hemostasis.    Rectal exam confirmed no extension into the rectum as a button hole was noted just anterior to the perineal muscle.    Ob was called in to make sure the perineal muscle was intact.  A figure of 8 stitch was placed to to help the perineum come together.    No laps were left in the patient.     Excellent hemostasis was noted. Needle count correct. Infant and patient in delivery room in good and stable  "condition.        Saw, Female-Yefri Po [[id number]]    Labor Event Times    Latent labor onset date/time: 6/3/2023 1500    Active labor onset date: 23 Onset time:  4:00 AM CDT   Dilation complete date: 23 Complete time:  7:09 AM   Start pushing date/time: 2023 0709      Labor Events     labor?: No  Labor Type: Spontaneous     Rupture date/time: 23 0400   Rupture type: Spontaneous Rupture of Membranes  Fluid color: Clear  Fluid odor: Normal     Augmentation: None     Delivery/Placenta Date and Time    Delivery Date: 23 Delivery Time:  7:56 AM   Placenta Date/Time: 2023  8:01 AM  Oxytocin given at the time of delivery: after delivery of baby  Delivering clinician: Omaira Allan MD   Other personnel present at delivery:  Provider Role   Nita Blcak RN Registered Nurse   Jumana Yung RN Registered Nurse         Vaginal Counts     Initial count performed by 2 team members:  Two Team Members   gabriele yung       Needles Suture Needles Sponges (RETIRED) Instruments   Initial counts 1 1 5    Added to count       Relief counts       Final counts             Placed during labor Accounted for at the end of labor   FSE NA NA   IUPC NA NA   Cervidil NA NA                     Apgars    Living status: Living   1 Minute 5 Minute 10 Minute 15 Minute 20 Minute   Skin color: 0  1       Heart rate: 2  2       Reflex irritability: 2  2       Muscle tone: 2  2       Respiratory effort: 2  2       Total: 8  9       Apgars assigned by: PETERSON BLACK RN     Cord    Vessels: 3 Vessels    Cord Complications: None   Cord Blood Disposition: Lab      Gases Sent?: No      Delayed cord clamping?: Yes      Cord Clamping Delay (seconds): >120 seconds      Stem cell collection?: No                     [place] Resuscitation    Methods: None     [place] Measurements    Weight: 6 lb 10.9 oz Length: 1' 8.25\"   Head circumference: 31.1 cm       Skin to Skin and Feeding Plan    Skin to skin initiation " date/time: 1841    Skin to skin with: Mother  Skin to skin end date/time: 1841       Labor Events and Shoulder Dystocia    Fetal Tracing Prior to Delivery: Category 2  Fetal Tracing Comments: category 1 until fetal terminal deceleration with    Shoulder dystocia present?: Neg     Delivery (Maternal) (Provider to Complete) (928360)    Episiotomy: None  Perineal lacerations: 1st Repaired?: Yes   Labial laceration: right Repaired?: Yes   Repair suture: 3-0 Vicryl  Number of repair packets: 1  Genital tract inspection done: Pos     Blood Loss  Mother: Yefri Rodriguez Po #[id number]   Start of Mother's Information    Delivery Blood Loss  23 0400 - 23 0840    None           End of Mother's Information  Mother: Yefri Rodriguez Po #[id number]          Delivery - Provider to Complete (203494)    Delivering clinician: Omaira Allan MD  Delivery Type (Choose the 1 that will go to the Birth History): Vaginal, Spontaneous    Other personnel:  Provider Role   Nita Black RN Registered Nurse   Jumana Allison RN Registered Nurse                               Placenta    Date/Time: 2023  8:01 AM  Removal: Spontaneous  Disposition: Hospital disposal           Anesthesia    Method: None                Presentation and Position    Presentation: Vertex    Position: Left Occiput Anterior             baby returned to mom for skin to skin after repair at her request.   1 lap was missing, though no lap were put into the vagina.    Will do an xray to confirm no laps are present.      Omaira Allan MD

## 2023-06-04 NOTE — PROGRESS NOTES
Data: [facility] to [facility]: 6/3/2023  7:37 PM.  Reason for maternal/fetal assessment is uterine contractions. Patient reports contractions getting more stronger since she was here this morning. Patient denies leaking of vaginal fluid/rupture of membranes, vaginal bleeding, abdominal pain, pelvic pressure, nausea, vomiting, headache, visual disturbances, epigastric or RUQ pain, significant edema. Patient reports fetal movement is normal. Patient is a 39w3d . Prenatal record reviewed. Pregnancy has been uncomplicated. Support person is present.     Fetal HR baseline was 130  , variability is moderate with accelerations, no decelerations. Uterine assessment is moderate to strong with palpatin during contractions and  at rest. Cervix 3.5 cm dilated and 90% effaced. Fetal station -2. Fetal presentation cephalic per cervical exam. Membranes: intact.    Vital signs wnl. Patient reports pain and is coping.     Action: Verbal consent for EFM. Triage assessment complete.

## 2023-06-04 NOTE — PROGRESS NOTES
Dr. Allan updated regarding pt's SVE, fetal tracing, contractions.   order received to admit pt and placed intrapartum orders.

## 2023-06-04 NOTE — PROGRESS NOTES
SVE 9cm with bulging bag of water and pt feeling urge to push with contractions. Dr. Allan called to come for delivery.

## 2023-06-04 NOTE — PLAN OF CARE
Goal Outcome Evaluation:      Plan of Care Reviewed With: patient    Overall Patient Progress: improvingOverall Patient Progress: improving         Stable postpartum course - has not voided yet. FFU with scant flow. Bonding well with baby.

## 2023-06-04 NOTE — PROGRESS NOTES
Dr. Allan updated on pt's arrival, fetal tracing, contractions, pain and initial SVE and rechecked SVE.  Pt desires to get some pain relieve and rest. Or received for morphine and atarax. Will update Dr. Allan as needed.

## 2023-06-04 NOTE — H&P
Maternal Admission H&P  [facility]  Date of Admission: 6/3/2023  Date of Service: 2023    Name      Yefri Li Saw         2000  MRN       [id number]  PCP        Omaira Allan at [facility], [phone].    ________________________________________________________________________    Assessment and Plan:  23 year old  at 39w4d.    Baby AGA. Anticipate .    Group B strep: negative    Pt has h/o esbl in urine in 2022.  No subsequent cultures.    ________________________________________________________________________    Chief Complaint: active labor management.    HPI: Yefri Li Saw is a 23 year old woman at 39w4d, based on an Estimated Date of Delivery: 2023. She presents with contractions on Saturday in the am that subsided somewhat, so she went home . She then presented in the evening with ongoing contractions.  She was found to be unchanged, but was carl regularly. She was given morphine and vistaril, and was able to rest a bit and progressed to 7cm.    At the time of my arrival, she was a rim.  She likely ruptured around 4am, but a forebag was noted and this ruptured clear fluid on its own.  She is feeling a very strong urge to push.      Patient Active Problem List    Diagnosis Date Noted     Pregnant 2023     Priority: Medium     Uterine contractions 2023     Priority: Medium      OB History    Para Term  AB Living   1 0 0 0 0 0   SAB IAB Ectopic Multiple Live Births   0 0 0 0 0      # Outcome Date GA Lbr Grey/2nd Weight Sex Delivery Anes PTL Lv   1 Current              Review of Systems Negative except what is noted in HPI.   No past medical history on file.   No past surgical history on file.Patient has no known allergies.  No family history on file.  Social History     Socioeconomic History     Marital status:      Spouse name: Not on file     Number of children: Not on file     Years of  "education: Not on file     Highest education level: Not on file   Occupational History     Not on file   Tobacco Use     Smoking status: Not on file     Smokeless tobacco: Not on file   Vaping Use     Vaping status: Not on file   Substance and Sexual Activity     Alcohol use: Not on file     Drug use: Not on file     Sexual activity: Not on file   Other Topics Concern     Not on file   Social History Narrative     Not on file     Social Determinants of Health     Financial Resource Strain: Not on file   Food Insecurity: Not on file   Transportation Needs: Not on file   Physical Activity: Not on file   Stress: Not on file   Social Connections: Not on file   Intimate Partner Violence: Not on file   Housing Stability: Not on file     Prior to Admission Medication List  No medications prior to admission.      Allergies  No Known Allergies    There is no immunization history on file for this patient.   Physical Exam  Patient Vitals for the past 24 hrs:   BP Temp Temp src Pulse Resp SpO2 Height   06/04/23 0531 (!) 125/90 98  F (36.7  C) Oral -- -- -- --   06/04/23 0430 -- 98.2  F (36.8  C) Oral 96 -- -- --   06/04/23 0332 116/68 -- -- -- -- -- --   06/03/23 2351 103/63 98  F (36.7  C) Oral -- 16 98 % --   06/03/23 2000 122/75 98  F (36.7  C) Oral -- 18 98 % --   06/03/23 1900 -- -- -- -- -- -- 1.499 m (4' 11\")     Wt Readings from Last 1 Encounters:   No data found for Wt   Prepregnancy: Pregravid weight not on file, BMI Could not be calculated. Total gain: Not found. (expected gain: Could not be calculated).    HEART: RRR, no murmur  LUNGS: CTA bilaterally  Fetal Heart Tones: Baseline 120 bpm, variability moderate (6-25 bpm), no decelerations. Reactive. Within the limitations of intermittent auscultation.   CONTRACTIONS:  regular, every 3 minutes.  CERVIX: dilation 9.5 cm , 100% effaced, soft, and 0 station.  FLUID: Clear.  Fetal Presentation: vertex.  Labs    Group B Strep PCR   Date Value Ref Range Status   05/16/2023 " Negative Negative Final     Comment:     Presumed negative for Streptococcus agalactiae (Group B Streptococcus) or the number of organisms may be below the limit of detection of the assay.      Antibody Screen   Date Value Ref Range Status   06/04/2023 Negative Negative Final     Hepatitis B Surface Antigen   Date Value Ref Range Status   11/10/2022 Nonreactive Nonreactive Final     Hemoglobin   Date Value Ref Range Status   03/31/2023 12.3 11.7 - 15.7 g/dL Final      Admission on 06/03/2023   Component Date Value Ref Range Status     ABO/RH(D) 06/04/2023 O POS   Final     Antibody Screen 06/04/2023 Negative  Negative Final     SPECIMEN EXPIRATION DATE 06/04/2023 [id number]   Final        Completed by:   Omaira Allan MD  [facility]  6/4/2023 6:40 AM   used: Not needed.

## 2023-06-04 NOTE — PROGRESS NOTES
Pt moved to room 1 from triage room.  Ирина very 2-6 minutes apart by palpation.   EFM per intermittent ausculation, fetal rate 125-130's, no audible decreases noted, increased present.

## 2023-06-05 VITALS
BODY MASS INDEX: 29.69 KG/M2 | DIASTOLIC BLOOD PRESSURE: 79 MMHG | SYSTOLIC BLOOD PRESSURE: 112 MMHG | HEART RATE: 86 BPM | HEIGHT: 59 IN | WEIGHT: 147.3 LBS | TEMPERATURE: 98.8 F | RESPIRATION RATE: 18 BRPM | OXYGEN SATURATION: 98 %

## 2023-06-05 PROBLEM — O47.9 UTERINE CONTRACTIONS: Status: RESOLVED | Noted: 2023-06-03 | Resolved: 2023-06-05

## 2023-06-05 PROBLEM — Z34.90 PREGNANT: Status: RESOLVED | Noted: 2023-06-04 | Resolved: 2023-06-05

## 2023-06-05 PROCEDURE — 0HQ9XZZ REPAIR PERINEUM SKIN, EXTERNAL APPROACH: ICD-10-PCS | Performed by: FAMILY MEDICINE

## 2023-06-05 PROCEDURE — 250N000013 HC RX MED GY IP 250 OP 250 PS 637: Performed by: FAMILY MEDICINE

## 2023-06-05 RX ORDER — IBUPROFEN 800 MG/1
800 TABLET, FILM COATED ORAL EVERY 6 HOURS PRN
Qty: 30 TABLET | Refills: 0 | Status: SHIPPED | OUTPATIENT
Start: 2023-06-05

## 2023-06-05 RX ORDER — DOCUSATE SODIUM 100 MG/1
100 CAPSULE, LIQUID FILLED ORAL DAILY
Qty: 10 CAPSULE | Refills: 0 | Status: SHIPPED | OUTPATIENT
Start: 2023-06-06

## 2023-06-05 RX ADMIN — IBUPROFEN 800 MG: 800 TABLET ORAL at 06:05

## 2023-06-05 RX ADMIN — WITCH HAZEL: 500 SOLUTION RECTAL; TOPICAL at 17:28

## 2023-06-05 RX ADMIN — DOCUSATE SODIUM 100 MG: 100 CAPSULE, LIQUID FILLED ORAL at 08:22

## 2023-06-05 RX ADMIN — IBUPROFEN 800 MG: 800 TABLET ORAL at 13:32

## 2023-06-05 ASSESSMENT — ACTIVITIES OF DAILY LIVING (ADL)
ADLS_ACUITY_SCORE: 18

## 2023-06-05 NOTE — PLAN OF CARE
Problem: Plan of Care - These are the overarching goals to be used throughout the patient stay.    Goal: Plan of Care Review  Description: The Plan of Care Review/Shift note should be completed every shift.  The Outcome Evaluation is a brief statement about your assessment that the patient is improving, declining, or no change.  This information will be displayed automatically on your shift note.  Outcome: Progressing     Problem: Postpartum (Vaginal Delivery)  Goal: Optimal Pain Control and Function  Outcome: Progressing  Intervention: Prevent or Manage Pain    Progressing well. Vital signs stable and afebrile. Voiding frequently and passing flatus. Pain managed with Ibuprofen (See MAR). Utilizing karol bottle, Tucks, and Dermoplast as needed.   Formula feeding baby every 2-3 hours. Attempting to put baby to breast. Utilizing breast pump. Encouraged to pump for every supplementation. Plans for lactation to consult prior to discharge.     Plans for discharge tonight 6/5/23.

## 2023-06-05 NOTE — DISCHARGE SUMMARY
Maternal Discharge Summary  [facility]  Date of Service: 2023    Name      Yefri Rodriguez         2000  MRN       [id number]  PCP        Omaira Allan    Admit Date:  6/3/2023  Discharge Date:  2023    Principal Diagnosis:    Patient Active Problem List   Diagnosis      (normal spontaneous vaginal delivery)       Delivery Type: vaginal, spontaneous     Hospital Course:  Yefri Rodriguez is a 23 year old now  s/p vaginal, spontaneous  at 39w4d on 23.  The patient's hospital course was unremarkable.  She recovered as anticipated and experienced no post-delivery complications.  On discharge, her pain was well controlled.  Vaginal bleeding is normal.  Voiding without difficulty.  Ambulating well and tolerating a normal diet.  No fevers.   Breastfeeding and primarily formula feeding.    Conditions complicating Pregnancy:  Other Complications:  none    Procedure(s) Performed: , repair first degree perineal laceration    Discharge Plan:   1. Discharge to Home. Condition at Discharge:  stable  2. Physical activity: Regular.  3. Diet:  Regular.  4. Home care nurse: per pt [facility] nurse coming out.  5. Contraception plan: undecided, will follow up at postpartum visit.  6. Follow up with Omaira Salinas in 6 weeks.    Discharge Medications:   Current Discharge Medication List      START taking these medications    Details   docusate sodium (COLACE) 100 MG capsule Take 1 capsule (100 mg) by mouth daily  Qty: 10 capsule, Refills: 0    Associated Diagnoses:  (normal spontaneous vaginal delivery)      ibuprofen (ADVIL/MOTRIN) 800 MG tablet Take 1 tablet (800 mg) by mouth every 6 hours as needed for other (cramping)  Qty: 30 tablet, Refills: 0    Associated Diagnoses:  (normal spontaneous vaginal delivery)             Allergies: No Known Allergies    Discharge Exam:  /70 (BP Location: Right arm, Patient Position: Semi-Cordero's, Cuff Size:  "Adult Regular)   Pulse 81   Temp 98.4  F (36.9  C) (Oral)   Resp 16   Ht 1.499 m (4' 11\")   SpO2 99%   Breastfeeding Unknown   General - alert, comfortable  Normal respirations  Abdomen - fundus firm, nontender, below umbilicus  Extremities - no edema, no calf tenderness    Post-partum hemoglobin:   Hemoglobin   Date Value Ref Range Status   03/31/2023 12.3 11.7 - 15.7 g/dL Final       Greater than 30 minutes were spent on this discharge on the day of service.      Completed by:   Adele Devi MD  [facility]  6/5/2023 10:35 AM    "

## 2023-06-05 NOTE — PROGRESS NOTES
"Maternal Postpartum Progress Note  [facility]  Date of Service: 2023    Name      Yefri Li Saw         2000  MRN       [id number]  PCP        Omaira Allan        Subjective:  The patient feels well:  Voiding without difficulty, lochia normal, tolerating normal diet, and passing flatus.  Pain is well controlled with current medications.  The patient has no emotional concerns.  No calf pain or swelling.  The baby is well and being fed by both breast and bottle.    Objective:  /70 (BP Location: Right arm, Patient Position: Semi-Cordero's, Cuff Size: Adult Regular)   Pulse 81   Temp 98.4  F (36.9  C) (Oral)   Resp 16   Ht 1.499 m (4' 11\")   SpO2 99%   Breastfeeding Unknown   Lochia is minimal.  The uterine fundus is firm at umbilicus.  Urinary output is adequate. No calf tenderness.  No edema.    Labs:  Hemoglobin   Date Value Ref Range Status   2023 12.3 11.7 - 15.7 g/dL Final       Assessment:    -Postpartum Day 1 s/p vaginal delivery  -Normal postpartum course.      Plan:    -Continue current care.  -Desires discharge today  -encouraged breastfeeding, lactation help prior to discharge.    Completed by:   Adele Devi MD, M.D.  [facility]  2023 10:28 AM  "

## 2023-06-05 NOTE — LACTATION NOTE
This note was copied from a baby's chart.  This writer met with Yefri Li per request.  Education given.  Infant needs food 8-12 + times in 24 hours, as infant cues.  The breasts need to be stimulated and drained at least 8 times in 24 hours, to build and protect the milk supply.  If infant is unable to latch onto the breast, supplement with mother's expressed milk/ formula, until infant is able to latch and transfer well at the breast.  Pump breasts for every supplement infant receives.  (Pump for 15 minutes.  Once milk is in, pump until breasts are drained).  Follow up at outpatient lactation clinic for further assistance.  [phone]  #2      Instructed on hand expression and several drops finger fed to infant.  She was able to verbalized the breasts need to be stimulated at least 8 times in 24 hours.  She will request help from RN if she puts infant to the breast.  This writer unable to witness a feeding, as infant had just bottle fed 25 ml formula dn was sleeping soundly.  She verbalizes understanding of all education given.  She denies any further questions.

## 2023-06-05 NOTE — PROGRESS NOTES
[facility] RN Care Coordinator Note    Yefri Li Saw  [id number]  2000    Chart reviewed, discharge follow-up plan discussed with patient's bedside RN, needs assessed. Post-delivery follow-up appointments with  planned in 2 & 6 weeks at [facility] [place]. Home care nurse visit not ordered by discharging provider.    Patient is reported to have support at home and would like to discharge today with . RN Care Coordinator will continue to follow and assist with discharge planning as needed.

## 2023-06-05 NOTE — PLAN OF CARE
Problem: Plan of Care - These are the overarching goals to be used throughout the patient stay.    Goal: Plan of Care Review  Description: The Plan of Care Review/Shift note should be completed every shift.  The Outcome Evaluation is a brief statement about your assessment that the patient is improving, declining, or no change.  This information will be displayed automatically on your shift note.  6/5/2023 1705 by Kailey Quinonez RN  Outcome: Adequate for Care Transition  Patient meets postpartum criteria to be discharged. Independent in her own cares. Educated on prescriptions, warning signs, and follow-up appointment. Breast pump sold and paperwork completed. All questions and concerns answered at this time.

## 2023-06-05 NOTE — PLAN OF CARE
Yefri's VSS. Pain well controlled with PRN ibuprofen and tylenol. Fundal assessment and bleeding WNL. She is up and independent. Her and  are attentive to infant.   Problem: Plan of Care - These are the overarching goals to be used throughout the patient stay.    Goal: Plan of Care Review  Description: The Plan of Care Review/Shift note should be completed every shift.  The Outcome Evaluation is a brief statement about your assessment that the patient is improving, declining, or no change.  This information will be displayed automatically on your shift note.  Outcome: Progressing  Goal: Optimal Comfort and Wellbeing  Outcome: Progressing  Intervention: Monitor Pain and Promote Comfort  Recent Flowsheet Documentation  Taken 6/5/2023 0137 by Patricia Solis, RN  Pain Management Interventions:    medication (see MAR)    cold applied    quiet environment facilitated  Intervention: Provide Person-Centered Care  Recent Flowsheet Documentation  Taken 6/5/2023 0137 by Patricia Solis, RN  Trust Relationship/Rapport:    care explained    choices provided    questions answered    questions encouraged    thoughts/feelings acknowledged  Goal: Readiness for Transition of Care  Outcome: Progressing     Problem: Postpartum (Vaginal Delivery)  Goal: Successful Maternal Role Transition  Outcome: Progressing  Goal: Hemostasis  Outcome: Progressing  Goal: Optimal Pain Control and Function  Outcome: Progressing  Intervention: Prevent or Manage Pain  Recent Flowsheet Documentation  Taken 6/5/2023 0137 by Patricia Solis, RN  Pain Management Interventions:    medication (see MAR)    cold applied    quiet environment facilitated  Perineal Care:    perineal spray bottle/warm water use encouraged    perineal hygiene encouraged    medicated pads applied

## 2024-01-15 ENCOUNTER — LAB REQUISITION (OUTPATIENT)
Dept: LAB | Facility: CLINIC | Age: 24
End: 2024-01-15

## 2024-01-15 DIAGNOSIS — R30.0 DYSURIA: ICD-10-CM

## 2024-01-15 PROCEDURE — 87086 URINE CULTURE/COLONY COUNT: CPT | Performed by: FAMILY MEDICINE

## 2024-01-17 LAB — BACTERIA UR CULT: ABNORMAL

## 2024-05-08 ENCOUNTER — PATIENT OUTREACH (OUTPATIENT)
Dept: CARE COORDINATION | Facility: CLINIC | Age: 24
End: 2024-05-08
Payer: COMMERCIAL

## 2024-05-08 NOTE — PROGRESS NOTES
Clinic Care Coordination Contact  Program:   [facility]: [place]   Renewal: [employer]   Date Applied:      CHARLA Outreach:   5/8/24: CTA called to see if patient needed assistance with their [employer] Renewal. Patient declined needing assistance and no follow up needed   Luna Sullivan  Care   [facility]  Clinic Care Coordination  [phone]      Health Insurance:        Referral/Screening:

## 2024-09-26 ENCOUNTER — HOSPITAL ENCOUNTER (EMERGENCY)
Facility: HOSPITAL | Age: 24
Discharge: HOME | End: 2024-09-26
Attending: EMERGENCY MEDICINE | Admitting: EMERGENCY MEDICINE
Payer: COMMERCIAL

## 2024-09-26 VITALS
WEIGHT: 129.7 LBS | SYSTOLIC BLOOD PRESSURE: 101 MMHG | HEART RATE: 67 BPM | TEMPERATURE: 97 F | BODY MASS INDEX: 26.15 KG/M2 | DIASTOLIC BLOOD PRESSURE: 67 MMHG | RESPIRATION RATE: 18 BRPM | OXYGEN SATURATION: 99 % | HEIGHT: 59 IN

## 2024-09-26 DIAGNOSIS — N30.00 ACUTE CYSTITIS WITHOUT HEMATURIA: ICD-10-CM

## 2024-09-26 DIAGNOSIS — R30.0 DYSURIA: ICD-10-CM

## 2024-09-26 LAB
ALBUMIN UR-MCNC: 20 MG/DL
APPEARANCE UR: CLEAR
BACTERIA #/AREA URNS HPF: ABNORMAL /HPF
BILIRUB UR QL STRIP: NEGATIVE
COLOR UR AUTO: ABNORMAL
GLUCOSE UR STRIP-MCNC: NEGATIVE MG/DL
HGB UR QL STRIP: ABNORMAL
KETONES UR STRIP-MCNC: NEGATIVE MG/DL
LEUKOCYTE ESTERASE UR QL STRIP: ABNORMAL
MUCOUS THREADS #/AREA URNS LPF: PRESENT /LPF
NITRATE UR QL: NEGATIVE
PH UR STRIP: 7.5 [PH] (ref 5–7)
RBC URINE: 20 /HPF
RENAL TUB EPI: 1 /HPF
SP GR UR STRIP: 1.02 (ref 1–1.03)
SQUAMOUS EPITHELIAL: 2 /HPF
UROBILINOGEN UR STRIP-MCNC: <2 MG/DL
WBC URINE: 46 /HPF

## 2024-09-26 PROCEDURE — 81001 URINALYSIS AUTO W/SCOPE: CPT | Performed by: EMERGENCY MEDICINE

## 2024-09-26 PROCEDURE — 250N000013 HC RX MED GY IP 250 OP 250 PS 637: Performed by: EMERGENCY MEDICINE

## 2024-09-26 PROCEDURE — 87186 SC STD MICRODIL/AGAR DIL: CPT | Performed by: EMERGENCY MEDICINE

## 2024-09-26 PROCEDURE — 99284 EMERGENCY DEPT VISIT MOD MDM: CPT | Performed by: EMERGENCY MEDICINE

## 2024-09-26 RX ORDER — PHENAZOPYRIDINE HYDROCHLORIDE 100 MG/1
100 TABLET, FILM COATED ORAL 3 TIMES DAILY PRN
Qty: 6 TABLET | Refills: 0 | Status: SHIPPED | OUTPATIENT
Start: 2024-09-26 | End: 2024-09-28

## 2024-09-26 RX ORDER — PHENAZOPYRIDINE HYDROCHLORIDE 100 MG/1
100 TABLET, FILM COATED ORAL ONCE
Status: COMPLETED | OUTPATIENT
Start: 2024-09-26 | End: 2024-09-26

## 2024-09-26 RX ORDER — CEPHALEXIN 500 MG/1
500 CAPSULE ORAL 2 TIMES DAILY
Qty: 14 CAPSULE | Refills: 0 | Status: SHIPPED | OUTPATIENT
Start: 2024-09-26 | End: 2024-10-03

## 2024-09-26 RX ORDER — CEPHALEXIN 500 MG/1
500 CAPSULE ORAL ONCE
Status: COMPLETED | OUTPATIENT
Start: 2024-09-26 | End: 2024-09-26

## 2024-09-26 RX ADMIN — CEPHALEXIN 500 MG: 500 CAPSULE ORAL at 23:28

## 2024-09-26 RX ADMIN — PHENAZOPYRIDINE 100 MG: 100 TABLET ORAL at 23:28

## 2024-09-26 ASSESSMENT — ACTIVITIES OF DAILY LIVING (ADL): ADLS_ACUITY_SCORE: 33

## 2024-09-26 ASSESSMENT — COLUMBIA-SUICIDE SEVERITY RATING SCALE - C-SSRS
1. IN THE PAST MONTH, HAVE YOU WISHED YOU WERE DEAD OR WISHED YOU COULD GO TO SLEEP AND NOT WAKE UP?: NO
2. HAVE YOU ACTUALLY HAD ANY THOUGHTS OF KILLING YOURSELF IN THE PAST MONTH?: NO
6. HAVE YOU EVER DONE ANYTHING, STARTED TO DO ANYTHING, OR PREPARED TO DO ANYTHING TO END YOUR LIFE?: NO

## 2024-09-27 LAB — BACTERIA UR CULT: ABNORMAL

## 2024-09-27 NOTE — DISCHARGE INSTRUCTIONS
You were seen in the Emergency Department today for painful urination. As we discussed, it looks like you have a urinary tract infection.      You are being sent with a prescription for antibiotics and a medication to help with the burning sensation. Please take as directed. Someone will call you if your urine culture shows your antibiotic needs to be changed.      Please return to the ER if you experience fever, chills, inability to keep food/fluids down, abdominal pain, and/or for any other new or concerning symptoms, otherwise please follow up with your primary doctor in 3-5 days for recheck.     Below is some information you might find useful.     Thank you for choosing [facility]. It was a pleasure taking care of you today!  -Dr. Adele Yeung

## 2024-09-27 NOTE — ED TRIAGE NOTES
Patient concerned for a UTI.  Burning sensation with urination and has the urge to go even after urinating.  Has been ongoing for last 2 days.

## 2024-09-27 NOTE — ED PROVIDER NOTES
EMERGENCY DEPARTMENT ENCOUNTER      NAME: Yefri Li Saw  AGE: 24 year old female  YOB: 2000  MRN: [id number]  EVALUATION DATE & TIME: 9/26/2024 10:47 PM    ED PROVIDER: Adele Yeung MD    Chief Complaint   Patient presents with    Dysuria       FINAL IMPRESSION  1. Acute cystitis without hematuria    2. Dysuria        MEDICAL DECISION MAKING   Yefri Li Saw is a 24 year old female who presents for evaluation of urinary urgency and dysuria.  Patient reports onset of symptoms yesterday morning.  She describes urinary urgency, frequency And dysuria as well as suprapubic pain.  She has a history of bladder infections she reports that her current symptoms feel similar to what she has had in the past.  Initially, she did notice some blood but this has since resolved.  She has not had any associated fever, chills, nausea, vomiting, upper abdominal pain or back pain.  No history of kidney stones or kidney infections.    I considered a broad differential including but not limited to urinary tract infection, hemorrhagic cystitis, pyelonephritis, urolithiasis, sexually transmitted disease. Patient is afebrile and hemodynamically stable, tolerating PO, and has a benign exam so I have low suspicion for systemic infectious process given history and exam and see no indication for laboratory or imaging workup. Patient has no discharge or concerns about sexually transmitted infection. Will plan to check UA but hold on further workup.     UA revealed small amount of blood, 20 RBCs, 46 WBCs, few bacteria, 75 LE; concerning for infection and likely, hemorrhagic cystitis.  Symptoms and examination findings are not concerning for pyelonephritis or infected urolithiasis. Discussed results and options for management with patient. We have agreed on plan to give first dose of antibiotics here in the ED. I did speak with ED pharmacist who agreed that Keflex and Pyridium would be safe.  Will give first dose of both here and discharged  with prescriptions. I explained to the patient that we will call if antibiotic needs to be changed once urine culture returns and she felt comfortable with this plan.     The importance of close follow up was discussed. I instructed Ms. Rodriguez to follow-up with her primary care provider. We reviewed warning signs and symptoms, and I instructed Ms. Rodriguez to return to the emergency department immediately if she develops any new or worsening symptoms. I provided additional verbal discharge instructions. Ms. Rodriguez expressed understanding and agreement with this plan of care, her questions were answered, and she was discharged in stable condition.           Considerations in Medical Decision Making  History:  Supplemental history from: N/A  External Record(s) reviewed: Documented in chart and Prior Labs: 01/15/2024 urine culture    Work Up:  Chart documentation includes differential considered and any EKGs or imaging independently interpreted by provider, where specified.  In additional to work up documented, I considered the following work up: Documented in chart, if applicable.    External consultation:  Discussion of management with another provider: ED pharmacist    Complicating factors:  Care impacted by chronic illness: Other: None  Care affected by social determinants of health: N/A    Disposition considerations: Discharge. I prescribed additional prescription strength medication(s) as charted. See documentation for any additional details.    MIPS Documentation: Not Applicable     ED COURSE  11:04 PM I met with the patient, obtained history, performed an initial exam, and discussed options and plan for diagnostics and treatment here in the ED. We discussed plans for discharge including supportive cares, symptomatic treatment, outpatient follow up, and reasons to return to the emergency department.       MEDICATIONS GIVEN IN THE ED  Medications   phenazopyridine (PYRIDIUM) tablet 100 mg (100 mg Oral $Given 9/26/24 4788)    cephALEXin (KEFLEX) capsule 500 mg (500 mg Oral $Given 9/26/24 2134)       NEW PRESCRIPTIONS STARTED AT TODAY'S VISIT  Discharge Medication List as of 9/26/2024 11:25 PM        START taking these medications    Details   cephALEXin (KEFLEX) 500 MG capsule Take 1 capsule (500 mg) by mouth 2 times daily for 7 days., Disp-14 capsule, R-0, E-Prescribe      phenazopyridine (PYRIDIUM) 100 MG tablet Take 1 tablet (100 mg) by mouth 3 times daily as needed for urinary tract discomfort. for urinary pain, burning, or frequency., Disp-6 tablet, R-0, E-Prescribe                =================================================================    HPI:    Patient information was obtained from: the patient    Use of : N/A      Yefri Guillermo Michael is a 24 year old female who presents to this emergency department by walk-in for evaluation of dysuria and urinary frequency.    The patient developed dysuria and noticed some blood in her urine yesterday morning (09/25). These symptoms improved by this morning (09/26), but worsened again in the evening. She endorses some pain over her bladder and urinary frequency. She took Tylenol yesterday with some improvement, but this did not help today.    She otherwise denies having a fever, nausea, vomiting.      The patient has previously had a bladder infection and says that the symptoms are reminiscent of that. No prior kidney stones. She is currently breast-feeding.    Urine culture from 01/15/2024 was resistant to ampicillin and Trimethoprim/Sulfamethoxazole. >100k CFU/mL of E. coli.       RELEVANT HISTORY, MEDICATIONS, & ALLERGIES   Past medical history, surgical history, family history, medications, and allergies reviewed and pertinent noted in HPI.    REVIEW OF SYSTEMS:  A complete review of systems was performed with pertinent positives and negatives noted in the HPI. All other systems negative.     PHYSICAL EXAM:    Vitals: /67   Pulse 67   Temp 97  F (36.1  C) (Temporal)    "Resp 18   Ht 1.499 m (4' 11\")   Wt 58.8 kg (129 lb 11.2 oz)   SpO2 99%   BMI 26.20 kg/m    General: Awake, alert, interactive.   HENT: Atraumatic. MMM. Conjunctive clear.  Neck: Full AROM.  Cardiovascular: Regular rate.  Respiratory/Chest: Normal work of breathing.   Abdomen: Non-distended.   Musculoskeletal: Normal appearing extremities without obvious deformities or signs of trauma.   Skin: Normal color. No visible rash.  Neurologic: Alert, oriented. Speech clear. CN's grossly intact. Moving all extremities spontaneously.   Psychiatric: Normal affect/mood.       LAB  Labs Ordered and Resulted from Time of ED Arrival to Time of ED Departure   ROUTINE UA WITH MICROSCOPIC REFLEX TO CULTURE - Abnormal       Result Value    Color Urine Light Yellow      Appearance Urine Clear      Glucose Urine Negative      Bilirubin Urine Negative      Ketones Urine Negative      Specific Gravity Urine 1.019      Blood Urine 0.03 mg/dL (*)     pH Urine 7.5 (*)     Protein Albumin Urine 20 (*)     Urobilinogen Urine <2.0      Nitrite Urine Negative      Leukocyte Esterase Urine 75 Filemon/uL (*)     Bacteria Urine Few (*)     Mucus Urine Present (*)     RBC Urine 20 (*)     WBC Urine 46 (*)     Squamous Epithelials Urine 2 (*)     Renal Tubular Epithelials Urine 1 (*)    URINE CULTURE       I, Cher Silva, am serving as a scribe to document services personally performed by Dr. Adele Yeung based on my observation and the provider's statements to me. I, Adele Yeung MD attest that Cher Silva is acting in a scribe capacity, has observed my performance of the services and has documented them in accordance with my direction.    Adele Yeung M.D.  Emergency Medicine  [facility] EMERGENCY DEPARTMENT  [facility] [facility] [id number]  [phone]  Dept: [phone]     Adele Yeung MD  09/27/24 0508    "

## 2024-09-28 ENCOUNTER — TELEPHONE (OUTPATIENT)
Dept: NURSING | Facility: CLINIC | Age: 24
End: 2024-09-28
Payer: COMMERCIAL

## 2024-09-28 NOTE — TELEPHONE ENCOUNTER
"[facility]    Reason for call: Lab Result Notification     Lab Result (including Rx patient on, if applicable).  If culture, copy of lab report at bottom.  Lab Result: Urine Culture  9/26/24 Prescribed CephALEXin (KEFLEX) 500 MG capsule Take 1 capsule (500 mg) by mouth 2 times daily for 7 days. - Oral     Creatinine Level (mg/dl) No results found for: \"CR\" Creatinine clearance (ml/min), if applicable    Creatinine clearance cannot be calculated (No successful lab value found.)     RN Recommendations/Instructions per [place] ED lab result protocol:   [facility] ED lab result protocol utilized: Urine Culture    Patient's current Symptoms at time of telephone:   Pt states she feels much better, pt states pain with urination continues but better than when seen in ED 9/28/24.    Patient/care giver notified to contact your PCP clinic or return to the Emergency department if your:  Symptoms return.  Symptoms do not improve after 3 days on antibiotic.  Symptoms do not resolve after completing antibiotic.  Symptoms worsen or other concerning symptoms.       Garima Landrum RN  "

## 2024-11-28 ENCOUNTER — HOSPITAL ENCOUNTER (EMERGENCY)
Facility: HOSPITAL | Age: 24
End: 2024-11-28
Attending: EMERGENCY MEDICINE
Payer: COMMERCIAL

## 2024-11-28 VITALS
SYSTOLIC BLOOD PRESSURE: 109 MMHG | OXYGEN SATURATION: 99 % | TEMPERATURE: 98.3 F | RESPIRATION RATE: 16 BRPM | DIASTOLIC BLOOD PRESSURE: 69 MMHG | HEART RATE: 75 BPM

## 2024-11-28 DIAGNOSIS — O21.9 NAUSEA AND VOMITING IN PREGNANCY: ICD-10-CM

## 2024-11-28 DIAGNOSIS — R53.1 GENERALIZED WEAKNESS: ICD-10-CM

## 2024-11-28 DIAGNOSIS — E87.6 HYPOKALEMIA: ICD-10-CM

## 2024-11-28 LAB
ALBUMIN SERPL BCG-MCNC: 4.1 G/DL (ref 3.5–5.2)
ALBUMIN UR-MCNC: NEGATIVE MG/DL
ALP SERPL-CCNC: 70 U/L (ref 40–150)
ALT SERPL W P-5'-P-CCNC: 18 U/L (ref 0–50)
ANION GAP SERPL CALCULATED.3IONS-SCNC: 13 MMOL/L (ref 7–15)
APPEARANCE UR: CLEAR
AST SERPL W P-5'-P-CCNC: 16 U/L (ref 0–45)
BACTERIA #/AREA URNS HPF: ABNORMAL /HPF
BILIRUB DIRECT SERPL-MCNC: <0.2 MG/DL (ref 0–0.3)
BILIRUB SERPL-MCNC: 0.3 MG/DL
BILIRUB UR QL STRIP: NEGATIVE
BUN SERPL-MCNC: 6 MG/DL (ref 6–20)
CALCIUM SERPL-MCNC: 8.8 MG/DL (ref 8.8–10.4)
CHLORIDE SERPL-SCNC: 103 MMOL/L (ref 98–107)
COLOR UR AUTO: ABNORMAL
CREAT SERPL-MCNC: 0.56 MG/DL (ref 0.51–0.95)
EGFRCR SERPLBLD CKD-EPI 2021: >90 ML/MIN/1.73M2
ERYTHROCYTE [DISTWIDTH] IN BLOOD BY AUTOMATED COUNT: 12.6 % (ref 10–15)
GLUCOSE SERPL-MCNC: 156 MG/DL (ref 70–99)
GLUCOSE UR STRIP-MCNC: 200 MG/DL
HCG INTACT+B SERPL-ACNC: ABNORMAL MIU/ML
HCO3 SERPL-SCNC: 20 MMOL/L (ref 22–29)
HCT VFR BLD AUTO: 38.6 % (ref 35–47)
HGB BLD-MCNC: 12.8 G/DL (ref 11.7–15.7)
HGB UR QL STRIP: NEGATIVE
KETONES UR STRIP-MCNC: ABNORMAL MG/DL
LEUKOCYTE ESTERASE UR QL STRIP: ABNORMAL
LIPASE SERPL-CCNC: 28 U/L (ref 13–60)
MAGNESIUM SERPL-MCNC: 1.7 MG/DL (ref 1.7–2.3)
MCH RBC QN AUTO: 30.1 PG (ref 26.5–33)
MCHC RBC AUTO-ENTMCNC: 33.2 G/DL (ref 31.5–36.5)
MCV RBC AUTO: 91 FL (ref 78–100)
MUCOUS THREADS #/AREA URNS LPF: PRESENT /LPF
NITRATE UR QL: NEGATIVE
PH UR STRIP: 6.5 [PH] (ref 5–7)
PLATELET # BLD AUTO: 264 10E3/UL (ref 150–450)
POTASSIUM SERPL-SCNC: 3.1 MMOL/L (ref 3.4–5.3)
PROT SERPL-MCNC: 6.8 G/DL (ref 6.4–8.3)
RBC # BLD AUTO: 4.25 10E6/UL (ref 3.8–5.2)
RBC URINE: 1 /HPF
SODIUM SERPL-SCNC: 136 MMOL/L (ref 135–145)
SP GR UR STRIP: 1.02 (ref 1–1.03)
SQUAMOUS EPITHELIAL: 10 /HPF
UROBILINOGEN UR STRIP-MCNC: <2 MG/DL
WBC # BLD AUTO: 9.9 10E3/UL (ref 4–11)
WBC URINE: 4 /HPF

## 2024-11-28 PROCEDURE — 36415 COLL VENOUS BLD VENIPUNCTURE: CPT | Performed by: EMERGENCY MEDICINE

## 2024-11-28 PROCEDURE — 84702 CHORIONIC GONADOTROPIN TEST: CPT | Performed by: EMERGENCY MEDICINE

## 2024-11-28 PROCEDURE — 81003 URINALYSIS AUTO W/O SCOPE: CPT | Performed by: EMERGENCY MEDICINE

## 2024-11-28 PROCEDURE — 250N000013 HC RX MED GY IP 250 OP 250 PS 637: Performed by: EMERGENCY MEDICINE

## 2024-11-28 PROCEDURE — 258N000003 HC RX IP 258 OP 636: Performed by: EMERGENCY MEDICINE

## 2024-11-28 PROCEDURE — 83735 ASSAY OF MAGNESIUM: CPT | Performed by: EMERGENCY MEDICINE

## 2024-11-28 PROCEDURE — 82248 BILIRUBIN DIRECT: CPT | Performed by: EMERGENCY MEDICINE

## 2024-11-28 PROCEDURE — 85041 AUTOMATED RBC COUNT: CPT | Performed by: EMERGENCY MEDICINE

## 2024-11-28 PROCEDURE — 83690 ASSAY OF LIPASE: CPT | Performed by: EMERGENCY MEDICINE

## 2024-11-28 PROCEDURE — 96361 HYDRATE IV INFUSION ADD-ON: CPT

## 2024-11-28 PROCEDURE — 85014 HEMATOCRIT: CPT | Performed by: EMERGENCY MEDICINE

## 2024-11-28 PROCEDURE — 96374 THER/PROPH/DIAG INJ IV PUSH: CPT

## 2024-11-28 PROCEDURE — 80051 ELECTROLYTE PANEL: CPT | Performed by: EMERGENCY MEDICINE

## 2024-11-28 PROCEDURE — 250N000011 HC RX IP 250 OP 636: Performed by: EMERGENCY MEDICINE

## 2024-11-28 PROCEDURE — 99284 EMERGENCY DEPT VISIT MOD MDM: CPT | Mod: 25

## 2024-11-28 PROCEDURE — 80053 COMPREHEN METABOLIC PANEL: CPT | Performed by: EMERGENCY MEDICINE

## 2024-11-28 PROCEDURE — 96375 TX/PRO/DX INJ NEW DRUG ADDON: CPT

## 2024-11-28 PROCEDURE — 82040 ASSAY OF SERUM ALBUMIN: CPT | Performed by: EMERGENCY MEDICINE

## 2024-11-28 RX ORDER — ONDANSETRON 2 MG/ML
4 INJECTION INTRAMUSCULAR; INTRAVENOUS ONCE
Status: COMPLETED | OUTPATIENT
Start: 2024-11-28 | End: 2024-11-28

## 2024-11-28 RX ORDER — ACETAMINOPHEN 325 MG/1
975 TABLET ORAL ONCE
Status: COMPLETED | OUTPATIENT
Start: 2024-11-28 | End: 2024-11-28

## 2024-11-28 RX ADMIN — SODIUM CHLORIDE 1000 ML: 9 INJECTION, SOLUTION INTRAVENOUS at 22:37

## 2024-11-28 RX ADMIN — ONDANSETRON 4 MG: 2 INJECTION INTRAMUSCULAR; INTRAVENOUS at 22:37

## 2024-11-28 RX ADMIN — FAMOTIDINE 20 MG: 10 INJECTION, SOLUTION INTRAVENOUS at 22:37

## 2024-11-28 RX ADMIN — ACETAMINOPHEN 975 MG: 325 TABLET ORAL at 22:36

## 2024-11-28 ASSESSMENT — ACTIVITIES OF DAILY LIVING (ADL)
ADLS_ACUITY_SCORE: 48
ADLS_ACUITY_SCORE: 48

## 2024-11-28 ASSESSMENT — COLUMBIA-SUICIDE SEVERITY RATING SCALE - C-SSRS
6. HAVE YOU EVER DONE ANYTHING, STARTED TO DO ANYTHING, OR PREPARED TO DO ANYTHING TO END YOUR LIFE?: NO
2. HAVE YOU ACTUALLY HAD ANY THOUGHTS OF KILLING YOURSELF IN THE PAST MONTH?: NO
1. IN THE PAST MONTH, HAVE YOU WISHED YOU WERE DEAD OR WISHED YOU COULD GO TO SLEEP AND NOT WAKE UP?: NO

## 2024-11-29 VITALS
TEMPERATURE: 98.3 F | OXYGEN SATURATION: 100 % | DIASTOLIC BLOOD PRESSURE: 58 MMHG | SYSTOLIC BLOOD PRESSURE: 102 MMHG | RESPIRATION RATE: 16 BRPM | HEART RATE: 67 BPM

## 2024-11-29 PROCEDURE — 258N000003 HC RX IP 258 OP 636: Performed by: EMERGENCY MEDICINE

## 2024-11-29 PROCEDURE — 250N000013 HC RX MED GY IP 250 OP 250 PS 637: Performed by: EMERGENCY MEDICINE

## 2024-11-29 PROCEDURE — 96361 HYDRATE IV INFUSION ADD-ON: CPT

## 2024-11-29 RX ORDER — POTASSIUM CHLORIDE 1.5 G/1.58G
40 POWDER, FOR SOLUTION ORAL ONCE
Status: COMPLETED | OUTPATIENT
Start: 2024-11-29 | End: 2024-11-29

## 2024-11-29 RX ORDER — ONDANSETRON 4 MG/1
4 TABLET, ORALLY DISINTEGRATING ORAL EVERY 8 HOURS PRN
Qty: 20 TABLET | Refills: 0 | Status: SHIPPED | OUTPATIENT
Start: 2024-11-29 | End: 2024-12-06

## 2024-11-29 RX ADMIN — POTASSIUM CHLORIDE 40 MEQ: 1.5 POWDER, FOR SOLUTION ORAL at 00:14

## 2024-11-29 RX ADMIN — SODIUM CHLORIDE 1000 ML: 9 INJECTION, SOLUTION INTRAVENOUS at 00:07

## 2024-11-29 ASSESSMENT — ACTIVITIES OF DAILY LIVING (ADL): ADLS_ACUITY_SCORE: 48

## 2024-11-29 NOTE — ED TRIAGE NOTES
The patient is pregnant. She does not know the weeks but her last period was 10/1. She reports over 2 weeks of vomiting and weakness. She also has a headache. She reports feeling dizzy when she stands.

## 2024-11-29 NOTE — ED PROVIDER NOTES
"EMERGENCY DEPARTMENT ENCOUNTER      NAME: Yefri Rodriguez  AGE: 24 year old female  YOB: 2000  EVALUATION DATE & TIME: 11/28/2024  9:48 PM    ED PROVIDER: Adele Yeung MD    Chief Complaint   Patient presents with    Vomiting    Generalized Weakness    Dizziness       FINAL IMPRESSION  No diagnosis found.    MEDICAL DECISION MAKING   Yefri Rodriguez is a 24 year old female who presents for evaluation of ***.            At the end of the encounter, we reviewed the results, potential diagnoses, as well as return precautions and recommendations for follow up. I instructed Ms. Rodriguez to return to the emergency department immediately if she develops any new or worsening symptoms and provided additional verbal discharge instructions. Ms. Rodriguez expressed understanding and agreement with this plan of care, her questions were answered, and she was discharged in stable condition.      Considerations in Medical Decision Making  History:  Obtained supplemental history: Supplemental history obtained?: No  Reviewed external records: External records reviewed?: Documented in chart  Care impacted by chronic illness: Documented in Chart    Work Up:  In additional to work up documented, I considered the following work up: Documented in chart, if applicable.  Chart documentation includes differential considered and any EKGs or imaging independently interpreted by provider, where specified.    External consultation:  Discussion of management with another provider: {EXTERNAL DISCUSSION:544959::\"Documented in chart, if applicable\"}    Disposition considerations: {ADMIT VS D/C:167869}    [facility] Documentation: {[facility] DOCUMENTATION:322711}       ED COURSE  10:22 PM I met with the patient, obtained history, performed an initial exam, and discussed options and plan for diagnostics and treatment here in the ED.   12:00 AM I updated the patient on their results.        MEDICATIONS GIVEN IN THE ED  Medications   sodium chloride 0.9% BOLUS 1,000 " mL (has no administration in time range)   ondansetron (ZOFRAN) injection 4 mg (has no administration in time range)   famotidine (PEPCID) injection 20 mg (has no administration in time range)   acetaminophen (TYLENOL) tablet 975 mg (has no administration in time range)       NEW PRESCRIPTIONS STARTED AT TODAY'S VISIT  New Prescriptions    No medications on file          =================================================================    HPI:    Use of : N/A      Yefri Rodriguez is a 24 year old female who presents vomiting, generalized weakness, dizziness.    The patient reports she has been sick for weeks and weak all over. Tonight, her symptoms significantly worsened. She also endorses nausea and vomiting. She notes the smell of fluids makes her sick. She has been taking Tylenol and Unisom She knows she is pregnant but is uncertain how far along she is. She experienced similar symptoms with her first pregnancy. No recent sick contacts or travels. No complaints of abdominal cramping, vaignal bleeding, urinary symptoms, diarrhea, cough, or any other associated symptom.    RELEVANT HISTORY, MEDICATIONS, & ALLERGIES   Past medical history, surgical history, family history, medications, and allergies reviewed and pertinent noted in HPI.    REVIEW OF SYSTEMS:  A complete review of systems was performed with pertinent positives and negatives noted in the HPI.     PHYSICAL EXAM:    Vitals: /69   Pulse 75   Temp 98.3  F (36.8  C) (Oral)   Resp 16   SpO2 99%    General: Alert and interactive, Appears fatigue and uncomfortable  HENT: Atraumatic. Full AROM of neck. Dry mucous membranes.  Cardiovascular: Regular rate and rhythm.   Chest/Pulmonary: Normal work of breathing. Speaking in complete sentences. Lungs CTAB. No chest wall tenderness or deformities.  Abdomen: Soft, nondistended. Nontender without guarding or rebound.  Extremities: Normal AROM of all major joints.  Skin: Warm and dry. Normal skin color.    Neuro: Speech clear. CNs grossly intact. Moves all extremities spontaneously.   Psych: Normal affect/mood, cooperative, memory appropriate.      LAB  Labs Ordered and Resulted from Time of ED Arrival to Time of ED Departure - No data to display    RADIOLOGY  No orders to display       EKG  Performed at: ***  Impression: ***  Rate: ***  Rhythm: ***  QRS Interval: ***  QTc Interval: ***  Comparison: ***    All laboratory and imaging results and EKG's were personally reviewed and interpreted by myself prior to disposition decision.       PROCEDURES  ***      I, Yossi Ceja, am serving as a scribe to document services personally performed by Dr. Adele Yeung based on my observation and the provider's statements to me. I, Adele Yeung MD attest that Yossi Ceja is acting in a scribe capacity, has observed my performance of the services and has documented them in accordance with my direction.    Adele Yeung M.D.  Emergency Medicine  [facility] EMERGENCY DEPARTMENT  [address] [place] [id number] [phone]  Dept: [phone]         =================================================================    HPI:    Use of : N/A      Yefri Li Saw is a 24 year old female who presents vomiting, generalized weakness, dizziness.    The patient reports she has been sick for weeks and weak all over. Tonight, her symptoms significantly worsened. She also endorses nausea and vomiting. She notes the smell of fluids makes her sick. She has been taking Tylenol and Unisom She knows she is pregnant but is uncertain how far along she is. She experienced similar symptoms with her first pregnancy. No recent sick contacts or travels. No complaints of abdominal cramping, vaignal bleeding, urinary symptoms, diarrhea, cough, or any other associated symptom.    RELEVANT HISTORY, MEDICATIONS, & ALLERGIES   Past medical history, surgical history, family history, medications, and allergies reviewed and pertinent noted in HPI.    REVIEW OF SYSTEMS:  A complete review of systems was performed with pertinent positives and negatives noted in the HPI.     PHYSICAL EXAM:    Vitals: /58   Pulse 67   Temp 98.3  F (36.8  C) (Oral)   Resp 16   SpO2 100%    General: Alert and interactive, Appears fatigue and uncomfortable  HENT: Atraumatic. Full AROM of neck. Dry mucous membranes.  Cardiovascular: Regular rate and rhythm.   Chest/Pulmonary: Normal work of breathing. Speaking in complete sentences. Lungs CTAB. No chest wall tenderness or deformities.  Abdomen: Soft, nondistended. Nontender without guarding or rebound.  Extremities: Normal AROM of all major joints.  Skin: Warm and dry. Normal skin color.   Neuro: Speech clear. CNs grossly intact. Moves all extremities spontaneously.   Psych: Normal affect/mood, cooperative, memory appropriate.      LAB  Labs Ordered and Resulted from Time of ED Arrival to Time of ED Departure   HCG QUANTITATIVE PREGNANCY - Abnormal       Result Value    hCG Quantitative 118,003 (*)    BASIC METABOLIC PANEL - Abnormal    Sodium 136       Potassium 3.1 (*)     Chloride 103      Carbon Dioxide (CO2) 20 (*)     Anion Gap 13      Urea Nitrogen 6.0      Creatinine 0.56      GFR Estimate >90      Calcium 8.8      Glucose 156 (*)    ROUTINE UA WITH MICROSCOPIC REFLEX TO CULTURE - Abnormal    Color Urine Light Yellow      Appearance Urine Clear      Glucose Urine 200 (*)     Bilirubin Urine Negative      Ketones Urine Trace (*)     Specific Gravity Urine 1.016      Blood Urine Negative      pH Urine 6.5      Protein Albumin Urine Negative      Urobilinogen Urine <2.0      Nitrite Urine Negative      Leukocyte Esterase Urine 25 Filemon/uL (*)     Bacteria Urine Few (*)     Mucus Urine Present (*)     RBC Urine 1      WBC Urine 4      Squamous Epithelials Urine 10 (*)    CBC WITH PLATELETS - Normal    WBC Count 9.9      RBC Count 4.25      Hemoglobin 12.8      Hematocrit 38.6      MCV 91      MCH 30.1      MCHC 33.2      RDW 12.6      Platelet Count 264     MAGNESIUM - Normal    Magnesium 1.7     HEPATIC FUNCTION PANEL - Normal    Protein Total 6.8      Albumin 4.1      Bilirubin Total 0.3      Alkaline Phosphatase 70      AST 16      ALT 18      Bilirubin Direct <0.20     LIPASE - Normal    Lipase 28           I, Yossi Ceja, am serving as a scribe to document services personally performed by Dr. Adele Yeung based on my observation and the provider's statements to me. I, Adele Yeung MD attest that Yossi Ceja is acting in a scribe capacity, has observed my performance of the services and has documented them in accordance with my direction.    Adele Yeung M.D.  Emergency Medicine  [facility] EMERGENCY DEPARTMENT  [address] [place] [id number] [phone]  Dept: [phone]       Adele Yeung MD  11/29/24 [id number]

## 2024-11-29 NOTE — DISCHARGE INSTRUCTIONS
You were seen in the emergency department today for nausea, vomiting, and generalized weakness.      Over the next few days, rest and drink plenty of water and other fluids.  Fizzy liquids like ginger ale might feel better in your stomach.  Eat foods that are gentle on your stomach,like the BRAT diet (Banana, Rice, Apple Sauce, Toast).      You may also take 650-1000mg of Acetaminophen (Tylenol) along with the Ibuprofen.  Take no more than 3000mg in 24hrs.    You should continue to use the unysom and vitamin B6 for nausea. I sent a prescription for Zofran (ondansetron) to your pharmacy so you can use this as needed for nausea that doesn't get better.     Please return to the Emergency Department immediately if you experience fever,worsening pain, inability to keep food/fluids down, and/or if your symptoms get worse, do not improve, or with any new concerns. Otherwise, please follow up with your OB and/or primary physician within the next week for recheck and ongoing management.     Below is some information you might find useful.     Thank you for choosing [facility]. It was a pleasure taking care of you today!  -Dr. Adele Yeung

## 2025-04-10 ENCOUNTER — TRANSFERRED RECORDS (OUTPATIENT)
Dept: HEALTH INFORMATION MANAGEMENT | Facility: CLINIC | Age: 25
End: 2025-04-10

## 2025-05-30 ENCOUNTER — TRANSFERRED RECORDS (OUTPATIENT)
Dept: HEALTH INFORMATION MANAGEMENT | Facility: CLINIC | Age: 25
End: 2025-05-30
Payer: COMMERCIAL

## 2025-06-02 ENCOUNTER — TRANSCRIBE ORDERS (OUTPATIENT)
Dept: MATERNAL FETAL MEDICINE | Facility: HOSPITAL | Age: 25
End: 2025-06-02
Payer: COMMERCIAL

## 2025-06-02 ENCOUNTER — MEDICAL CORRESPONDENCE (OUTPATIENT)
Dept: HEALTH INFORMATION MANAGEMENT | Facility: CLINIC | Age: 25
End: 2025-06-02
Payer: COMMERCIAL

## 2025-06-02 ENCOUNTER — TELEPHONE (OUTPATIENT)
Dept: MATERNAL FETAL MEDICINE | Facility: HOSPITAL | Age: 25
End: 2025-06-02
Payer: COMMERCIAL

## 2025-06-02 DIAGNOSIS — O26.90 PREGNANCY RELATED CONDITION, ANTEPARTUM: Primary | ICD-10-CM

## 2025-06-02 NOTE — TELEPHONE ENCOUNTER
Reviewed referral with Dr. Santiago from Dr. Allan for decreased fetal movement at 0845 this morning.  Recommendation was made to see pt in clinic today for a level 2 ultrasound and BPP. If pt unable to come in today, and still feeling decreased fetal movement, [facility] should be seen in labor and delivery triage.  Yefri was called at approx 0900 and offered 2 appt times today, 1045 and 1330.  She stated she was unable to get to [facility] for an appt today until after 4 pm.  I spoke with Anabela at [facility] regarding pt's inability to come to [facility] for an appt today and concern for decreased fetal movement.  Also relayed Dr. Santiago's recommendation for pt to be evaluated in triage if still experiencing decreased fetal movement and unable to be seen at [facility] today.  Anabela stated she would review Dr. Santiago's recommendation and pt's inability to be seen at [facility] today with Dr. Allan.  Received a call back from Anabela at 1215 stating their care team reached out to Yefri and she is feeling fetal movement.  Dr. Allan is still requesting a level 2 ultrasound to be done in the next 2 weeks and will send a new referral for the ultrasound.      Tonie Quiroz RN

## 2025-06-03 ENCOUNTER — MEDICAL CORRESPONDENCE (OUTPATIENT)
Dept: HEALTH INFORMATION MANAGEMENT | Facility: CLINIC | Age: 25
End: 2025-06-03
Payer: COMMERCIAL

## 2025-06-04 ENCOUNTER — TELEPHONE (OUTPATIENT)
Dept: MATERNAL FETAL MEDICINE | Facility: HOSPITAL | Age: 25
End: 2025-06-04
Payer: COMMERCIAL

## 2025-06-04 NOTE — TELEPHONE ENCOUNTER
Spoke with Katia at [facility] as a followup to previous conversation on 6/2 by Tonie Quiroz, RN. A new referral was not received with an updated indication for [facility] referral. Katia will need to speak with Dr. Allan to have her undate the indication/reason for ultrasound. Katia states this referral will be resent on 6/5/25. Will contact [facility] with any additional concerns.

## 2025-06-05 ENCOUNTER — TRANSCRIBE ORDERS (OUTPATIENT)
Dept: MATERNAL FETAL MEDICINE | Facility: HOSPITAL | Age: 25
End: 2025-06-05
Payer: COMMERCIAL

## 2025-06-05 ENCOUNTER — MEDICAL CORRESPONDENCE (OUTPATIENT)
Dept: HEALTH INFORMATION MANAGEMENT | Facility: CLINIC | Age: 25
End: 2025-06-05

## 2025-06-05 DIAGNOSIS — O26.90 PREGNANCY RELATED CONDITION: Primary | ICD-10-CM

## 2025-06-09 ENCOUNTER — PRE VISIT (OUTPATIENT)
Dept: MATERNAL FETAL MEDICINE | Facility: HOSPITAL | Age: 25
End: 2025-06-09
Payer: COMMERCIAL

## 2025-06-09 ENCOUNTER — OFFICE VISIT (OUTPATIENT)
Dept: MATERNAL FETAL MEDICINE | Facility: HOSPITAL | Age: 25
End: 2025-06-09
Attending: STUDENT IN AN ORGANIZED HEALTH CARE EDUCATION/TRAINING PROGRAM
Payer: COMMERCIAL

## 2025-06-09 ENCOUNTER — ANCILLARY PROCEDURE (OUTPATIENT)
Dept: ULTRASOUND IMAGING | Facility: HOSPITAL | Age: 25
End: 2025-06-09
Attending: STUDENT IN AN ORGANIZED HEALTH CARE EDUCATION/TRAINING PROGRAM
Payer: COMMERCIAL

## 2025-06-09 DIAGNOSIS — O35.9XX0 SUSPECTED FETAL ANOMALY, ANTEPARTUM, SINGLE OR UNSPECIFIED FETUS: Primary | ICD-10-CM

## 2025-06-09 DIAGNOSIS — O26.90 PREGNANCY RELATED CONDITION, ANTEPARTUM: ICD-10-CM

## 2025-06-09 PROCEDURE — 99207 PR NO CHARGE LOS: CPT | Performed by: STUDENT IN AN ORGANIZED HEALTH CARE EDUCATION/TRAINING PROGRAM

## 2025-06-09 PROCEDURE — 76811 OB US DETAILED SNGL FETUS: CPT

## 2025-06-09 NOTE — NURSING NOTE
Yefri Li Saw is a  at 34w5d who presents to [facility] for L2 ultrasound. Pt reports positive fetal movement. Pt denies bldg/lof/change in discharge, contractions, headache, vision changes, chest pain/SOB or edema. SBAR given to Dr. Epps, see note in Epic.

## 2025-06-09 NOTE — PROGRESS NOTES
The patient was seen for an [facility] in [facility] clinic today.  For a detailed report of the ultrasound examination, please see the ultrasound report which can be found under the imaging tab.    If you have questions regarding today's evaluation or if we can be of further service, please contact the [facility].    Sherif Epps M.D.  Maternal Fetal-Medicine Specialist

## 2025-06-24 ENCOUNTER — LAB REQUISITION (OUTPATIENT)
Dept: LAB | Facility: CLINIC | Age: 25
End: 2025-06-24

## 2025-06-24 DIAGNOSIS — Z34.80 ENCOUNTER FOR SUPERVISION OF OTHER NORMAL PREGNANCY, UNSPECIFIED TRIMESTER: ICD-10-CM

## 2025-06-24 PROCEDURE — 87653 STREP B DNA AMP PROBE: CPT | Performed by: FAMILY MEDICINE

## 2025-06-26 LAB — GP B STREP DNA SPEC QL NAA+PROBE: NEGATIVE

## 2025-07-01 ENCOUNTER — HOSPITAL ENCOUNTER (INPATIENT)
Facility: HOSPITAL | Age: 25
LOS: 1 days | Discharge: HOME | End: 2025-07-03
Attending: FAMILY MEDICINE | Admitting: FAMILY MEDICINE
Payer: COMMERCIAL

## 2025-07-01 PROCEDURE — G0463 HOSPITAL OUTPT CLINIC VISIT: HCPCS | Mod: 6MC

## 2025-07-01 RX ORDER — LIDOCAINE 40 MG/G
CREAM TOPICAL
Status: DISCONTINUED | OUTPATIENT
Start: 2025-07-01 | End: 2025-07-02 | Stop reason: HOSPADM

## 2025-07-01 RX ORDER — PRENATAL VIT/IRON FUM/FOLIC AC 27MG-0.8MG
1 TABLET ORAL DAILY
COMMUNITY

## 2025-07-01 RX ORDER — PSEUDOEPHED/ACETAMINOPH/DIPHEN 30MG-500MG
500 TABLET ORAL EVERY 6 HOURS PRN
COMMUNITY
Start: 2024-11-21

## 2025-07-02 PROBLEM — K21.9 GASTROESOPHAGEAL REFLUX DISEASE: Status: ACTIVE | Noted: 2025-07-02

## 2025-07-02 PROBLEM — Z34.90 PREGNANCY: Status: ACTIVE | Noted: 2025-07-02

## 2025-07-02 PROBLEM — F32.5 MAJOR DEPRESSIVE DISORDER WITH SINGLE EPISODE, IN FULL REMISSION: Status: ACTIVE | Noted: 2025-07-02

## 2025-07-02 PROBLEM — K59.00 CONSTIPATION: Status: ACTIVE | Noted: 2025-07-02

## 2025-07-02 LAB
ABO + RH BLD: NORMAL
BLD GP AB SCN SERPL QL: NEGATIVE
ERYTHROCYTE [DISTWIDTH] IN BLOOD BY AUTOMATED COUNT: 13.5 % (ref 10–15)
HBV SURFACE AG SERPL QL IA: NONREACTIVE
HCT VFR BLD AUTO: 38.4 % (ref 35–47)
HGB BLD-MCNC: 12.3 G/DL (ref 11.7–15.7)
HGB BLD-MCNC: 12.7 G/DL (ref 11.7–15.7)
HIV 1+2 AB+HIV1 P24 AG SERPL QL IA: NONREACTIVE
HIV 1+2 AB+HIV1P24 AG SERPLBLD IA.RAPID: NON REACTIVE
HIV 1+2 AB+HIV1P24 AG SERPLBLD IA.RAPID: REACTIVE
HIV INTERPRETATION: ABNORMAL
MCH RBC QN AUTO: 30.6 PG (ref 26.5–33)
MCHC RBC AUTO-ENTMCNC: 33.1 G/DL (ref 31.5–36.5)
MCV RBC AUTO: 91 FL (ref 78–100)
MCV RBC AUTO: 93 FL (ref 78–100)
PLATELET # BLD AUTO: 246 10E3/UL (ref 150–450)
RBC # BLD AUTO: 4.15 10E6/UL (ref 3.8–5.2)
SPECIMEN EXP DATE BLD: NORMAL
T PALLIDUM AB SER QL: NONREACTIVE
WBC # BLD AUTO: 12.7 10E3/UL (ref 4–11)

## 2025-07-02 PROCEDURE — G0463 HOSPITAL OUTPT CLINIC VISIT: HCPCS | Mod: 6MC

## 2025-07-02 PROCEDURE — 86900 BLOOD TYPING SEROLOGIC ABO: CPT | Performed by: FAMILY MEDICINE

## 2025-07-02 PROCEDURE — 85018 HEMOGLOBIN: CPT | Performed by: FAMILY MEDICINE

## 2025-07-02 PROCEDURE — 0UQGXZZ REPAIR VAGINA, EXTERNAL APPROACH: ICD-10-PCS | Performed by: FAMILY MEDICINE

## 2025-07-02 PROCEDURE — 250N000009 HC RX 250: Performed by: FAMILY MEDICINE

## 2025-07-02 PROCEDURE — 250N000013 HC RX MED GY IP 250 OP 250 PS 637: Performed by: FAMILY MEDICINE

## 2025-07-02 PROCEDURE — 36415 COLL VENOUS BLD VENIPUNCTURE: CPT | Performed by: FAMILY MEDICINE

## 2025-07-02 PROCEDURE — 250N000011 HC RX IP 250 OP 636: Performed by: FAMILY MEDICINE

## 2025-07-02 PROCEDURE — 999N000104 HC STATISTIC NO CHARGE: Performed by: INTERNAL MEDICINE

## 2025-07-02 PROCEDURE — 722N000001 HC LABOR CARE VAGINAL DELIVERY SINGLE

## 2025-07-02 PROCEDURE — 86780 TREPONEMA PALLIDUM: CPT | Performed by: FAMILY MEDICINE

## 2025-07-02 PROCEDURE — 120N000001 HC R&B MED SURG/OB

## 2025-07-02 PROCEDURE — 85027 COMPLETE CBC AUTOMATED: CPT | Performed by: FAMILY MEDICINE

## 2025-07-02 PROCEDURE — 10907ZC DRAINAGE OF AMNIOTIC FLUID, THERAPEUTIC FROM PRODUCTS OF CONCEPTION, VIA NATURAL OR ARTIFICIAL OPENING: ICD-10-PCS | Performed by: FAMILY MEDICINE

## 2025-07-02 RX ORDER — IBUPROFEN 800 MG/1
800 TABLET, FILM COATED ORAL EVERY 6 HOURS PRN
Status: DISCONTINUED | OUTPATIENT
Start: 2025-07-02 | End: 2025-07-03 | Stop reason: HOSPADM

## 2025-07-02 RX ORDER — METOCLOPRAMIDE HYDROCHLORIDE 5 MG/ML
10 INJECTION INTRAMUSCULAR; INTRAVENOUS EVERY 6 HOURS PRN
Status: DISCONTINUED | OUTPATIENT
Start: 2025-07-02 | End: 2025-07-02 | Stop reason: HOSPADM

## 2025-07-02 RX ORDER — TRANEXAMIC ACID 10 MG/ML
1 INJECTION, SOLUTION INTRAVENOUS EVERY 30 MIN PRN
Status: DISCONTINUED | OUTPATIENT
Start: 2025-07-02 | End: 2025-07-02 | Stop reason: HOSPADM

## 2025-07-02 RX ORDER — HYDROCORTISONE 25 MG/G
CREAM TOPICAL 3 TIMES DAILY PRN
Status: DISCONTINUED | OUTPATIENT
Start: 2025-07-02 | End: 2025-07-03 | Stop reason: HOSPADM

## 2025-07-02 RX ORDER — MISOPROSTOL 200 UG/1
400 TABLET ORAL
Status: DISCONTINUED | OUTPATIENT
Start: 2025-07-02 | End: 2025-07-02 | Stop reason: HOSPADM

## 2025-07-02 RX ORDER — METOCLOPRAMIDE 10 MG/1
10 TABLET ORAL EVERY 6 HOURS PRN
Status: DISCONTINUED | OUTPATIENT
Start: 2025-07-02 | End: 2025-07-02 | Stop reason: HOSPADM

## 2025-07-02 RX ORDER — ONDANSETRON 2 MG/ML
4 INJECTION INTRAMUSCULAR; INTRAVENOUS EVERY 6 HOURS PRN
Status: DISCONTINUED | OUTPATIENT
Start: 2025-07-02 | End: 2025-07-02 | Stop reason: HOSPADM

## 2025-07-02 RX ORDER — OXYTOCIN 10 [USP'U]/ML
10 INJECTION, SOLUTION INTRAMUSCULAR; INTRAVENOUS
Status: DISCONTINUED | OUTPATIENT
Start: 2025-07-02 | End: 2025-07-03 | Stop reason: HOSPADM

## 2025-07-02 RX ORDER — KETOROLAC TROMETHAMINE 15 MG/ML
15 INJECTION, SOLUTION INTRAMUSCULAR; INTRAVENOUS
Status: COMPLETED | OUTPATIENT
Start: 2025-07-02 | End: 2025-07-02

## 2025-07-02 RX ORDER — PROCHLORPERAZINE MALEATE 10 MG
10 TABLET ORAL EVERY 6 HOURS PRN
Status: DISCONTINUED | OUTPATIENT
Start: 2025-07-02 | End: 2025-07-02 | Stop reason: HOSPADM

## 2025-07-02 RX ORDER — LOPERAMIDE HYDROCHLORIDE 2 MG/1
4 CAPSULE ORAL
Status: DISCONTINUED | OUTPATIENT
Start: 2025-07-02 | End: 2025-07-02 | Stop reason: HOSPADM

## 2025-07-02 RX ORDER — LOPERAMIDE HYDROCHLORIDE 2 MG/1
4 CAPSULE ORAL
Status: DISCONTINUED | OUTPATIENT
Start: 2025-07-02 | End: 2025-07-03 | Stop reason: HOSPADM

## 2025-07-02 RX ORDER — SODIUM CHLORIDE, SODIUM LACTATE, POTASSIUM CHLORIDE, CALCIUM CHLORIDE 600; 310; 30; 20 MG/100ML; MG/100ML; MG/100ML; MG/100ML
INJECTION, SOLUTION INTRAVENOUS CONTINUOUS
Status: DISCONTINUED | OUTPATIENT
Start: 2025-07-02 | End: 2025-07-02 | Stop reason: HOSPADM

## 2025-07-02 RX ORDER — ONDANSETRON 4 MG/1
4 TABLET, ORALLY DISINTEGRATING ORAL EVERY 6 HOURS PRN
Status: DISCONTINUED | OUTPATIENT
Start: 2025-07-02 | End: 2025-07-02 | Stop reason: HOSPADM

## 2025-07-02 RX ORDER — TRANEXAMIC ACID 10 MG/ML
1 INJECTION, SOLUTION INTRAVENOUS EVERY 30 MIN PRN
Status: DISCONTINUED | OUTPATIENT
Start: 2025-07-02 | End: 2025-07-03 | Stop reason: HOSPADM

## 2025-07-02 RX ORDER — LIDOCAINE 40 MG/G
CREAM TOPICAL
Status: DISCONTINUED | OUTPATIENT
Start: 2025-07-02 | End: 2025-07-03 | Stop reason: HOSPADM

## 2025-07-02 RX ORDER — METHYLERGONOVINE MALEATE 0.2 MG/ML
200 INJECTION INTRAVENOUS
Status: DISCONTINUED | OUTPATIENT
Start: 2025-07-02 | End: 2025-07-03 | Stop reason: HOSPADM

## 2025-07-02 RX ORDER — OXYTOCIN/0.9 % SODIUM CHLORIDE 30/500 ML
100-340 PLASTIC BAG, INJECTION (ML) INTRAVENOUS CONTINUOUS PRN
Status: DISCONTINUED | OUTPATIENT
Start: 2025-07-02 | End: 2025-07-03 | Stop reason: HOSPADM

## 2025-07-02 RX ORDER — MISOPROSTOL 200 UG/1
800 TABLET ORAL
Status: DISCONTINUED | OUTPATIENT
Start: 2025-07-02 | End: 2025-07-02 | Stop reason: HOSPADM

## 2025-07-02 RX ORDER — OXYTOCIN/0.9 % SODIUM CHLORIDE 30/500 ML
340 PLASTIC BAG, INJECTION (ML) INTRAVENOUS CONTINUOUS PRN
Status: DISCONTINUED | OUTPATIENT
Start: 2025-07-02 | End: 2025-07-02 | Stop reason: HOSPADM

## 2025-07-02 RX ORDER — TERBUTALINE SULFATE 1 MG/ML
0.25 INJECTION SUBCUTANEOUS
Status: DISCONTINUED | OUTPATIENT
Start: 2025-07-02 | End: 2025-07-02 | Stop reason: HOSPADM

## 2025-07-02 RX ORDER — CARBOPROST TROMETHAMINE 250 UG/ML
250 INJECTION, SOLUTION INTRAMUSCULAR
Status: DISCONTINUED | OUTPATIENT
Start: 2025-07-02 | End: 2025-07-02 | Stop reason: HOSPADM

## 2025-07-02 RX ORDER — OXYTOCIN/0.9 % SODIUM CHLORIDE 30/500 ML
340 PLASTIC BAG, INJECTION (ML) INTRAVENOUS CONTINUOUS PRN
Status: DISCONTINUED | OUTPATIENT
Start: 2025-07-02 | End: 2025-07-03 | Stop reason: HOSPADM

## 2025-07-02 RX ORDER — LOPERAMIDE HYDROCHLORIDE 2 MG/1
2 CAPSULE ORAL
Status: DISCONTINUED | OUTPATIENT
Start: 2025-07-02 | End: 2025-07-03 | Stop reason: HOSPADM

## 2025-07-02 RX ORDER — BISACODYL 10 MG
10 SUPPOSITORY, RECTAL RECTAL DAILY PRN
Status: DISCONTINUED | OUTPATIENT
Start: 2025-07-02 | End: 2025-07-03 | Stop reason: HOSPADM

## 2025-07-02 RX ORDER — IBUPROFEN 800 MG/1
800 TABLET, FILM COATED ORAL
Status: COMPLETED | OUTPATIENT
Start: 2025-07-02 | End: 2025-07-02

## 2025-07-02 RX ORDER — MISOPROSTOL 200 UG/1
800 TABLET ORAL
Status: DISCONTINUED | OUTPATIENT
Start: 2025-07-02 | End: 2025-07-03 | Stop reason: HOSPADM

## 2025-07-02 RX ORDER — LOPERAMIDE HYDROCHLORIDE 2 MG/1
2 CAPSULE ORAL
Status: DISCONTINUED | OUTPATIENT
Start: 2025-07-02 | End: 2025-07-02 | Stop reason: HOSPADM

## 2025-07-02 RX ORDER — AMOXICILLIN 250 MG
2 CAPSULE ORAL
Status: DISCONTINUED | OUTPATIENT
Start: 2025-07-02 | End: 2025-07-03 | Stop reason: HOSPADM

## 2025-07-02 RX ORDER — FENTANYL CITRATE 50 UG/ML
50 INJECTION, SOLUTION INTRAMUSCULAR; INTRAVENOUS EVERY 30 MIN PRN
Refills: 0 | Status: DISCONTINUED | OUTPATIENT
Start: 2025-07-02 | End: 2025-07-02 | Stop reason: HOSPADM

## 2025-07-02 RX ORDER — OXYTOCIN 10 [USP'U]/ML
10 INJECTION, SOLUTION INTRAMUSCULAR; INTRAVENOUS
Status: DISCONTINUED | OUTPATIENT
Start: 2025-07-02 | End: 2025-07-02 | Stop reason: HOSPADM

## 2025-07-02 RX ORDER — MISOPROSTOL 200 UG/1
400 TABLET ORAL
Status: DISCONTINUED | OUTPATIENT
Start: 2025-07-02 | End: 2025-07-03 | Stop reason: HOSPADM

## 2025-07-02 RX ORDER — CARBOPROST TROMETHAMINE 250 UG/ML
250 INJECTION, SOLUTION INTRAMUSCULAR
Status: DISCONTINUED | OUTPATIENT
Start: 2025-07-02 | End: 2025-07-03 | Stop reason: HOSPADM

## 2025-07-02 RX ORDER — ACETAMINOPHEN 325 MG/1
650 TABLET ORAL EVERY 4 HOURS PRN
Status: DISCONTINUED | OUTPATIENT
Start: 2025-07-02 | End: 2025-07-03 | Stop reason: HOSPADM

## 2025-07-02 RX ORDER — METHYLERGONOVINE MALEATE 0.2 MG/ML
200 INJECTION INTRAVENOUS
Status: DISCONTINUED | OUTPATIENT
Start: 2025-07-02 | End: 2025-07-02 | Stop reason: HOSPADM

## 2025-07-02 RX ORDER — POLYETHYLENE GLYCOL 3350 17 G/17G
17 POWDER, FOR SOLUTION ORAL DAILY PRN
Status: DISCONTINUED | OUTPATIENT
Start: 2025-07-02 | End: 2025-07-03 | Stop reason: HOSPADM

## 2025-07-02 RX ORDER — CITRIC ACID/SODIUM CITRATE 334-500MG
30 SOLUTION, ORAL ORAL
Status: DISCONTINUED | OUTPATIENT
Start: 2025-07-02 | End: 2025-07-02 | Stop reason: HOSPADM

## 2025-07-02 RX ADMIN — ACETAMINOPHEN 650 MG: 325 TABLET ORAL at 05:08

## 2025-07-02 RX ADMIN — ACETAMINOPHEN 650 MG: 325 TABLET ORAL at 08:56

## 2025-07-02 RX ADMIN — METHYLCELLULOSE 1000 MG: 500 TABLET ORAL at 08:56

## 2025-07-02 RX ADMIN — Medication 340 ML/HR: at 02:50

## 2025-07-02 RX ADMIN — IBUPROFEN 800 MG: 800 TABLET ORAL at 14:45

## 2025-07-02 RX ADMIN — LIDOCAINE HYDROCHLORIDE 20 ML: 10 INJECTION, SOLUTION EPIDURAL; INFILTRATION; INTRACAUDAL; PERINEURAL at 02:55

## 2025-07-02 RX ADMIN — ACETAMINOPHEN 650 MG: 325 TABLET ORAL at 23:55

## 2025-07-02 RX ADMIN — ACETAMINOPHEN 650 MG: 325 TABLET ORAL at 14:29

## 2025-07-02 RX ADMIN — BENZOCAINE AND LEVOMENTHOL: 200; 5 SPRAY TOPICAL at 08:56

## 2025-07-02 RX ADMIN — IBUPROFEN 800 MG: 800 TABLET ORAL at 21:43

## 2025-07-02 RX ADMIN — IBUPROFEN 800 MG: 800 TABLET ORAL at 08:56

## 2025-07-02 RX ADMIN — KETOROLAC TROMETHAMINE 15 MG: 15 INJECTION, SOLUTION INTRAMUSCULAR; INTRAVENOUS at 03:06

## 2025-07-02 ASSESSMENT — ACTIVITIES OF DAILY LIVING (ADL)
ADLS_ACUITY_SCORE: 22
ADLS_ACUITY_SCORE: 26
ADLS_ACUITY_SCORE: 26
ADLS_ACUITY_SCORE: 23
ADLS_ACUITY_SCORE: 26
ADLS_ACUITY_SCORE: 22
ADLS_ACUITY_SCORE: 26
ADLS_ACUITY_SCORE: 26
ADLS_ACUITY_SCORE: 22
ADLS_ACUITY_SCORE: 23
ADLS_ACUITY_SCORE: 23
ADLS_ACUITY_SCORE: 26
ADLS_ACUITY_SCORE: 23
ADLS_ACUITY_SCORE: 22
ADLS_ACUITY_SCORE: 26
ADLS_ACUITY_SCORE: 26

## 2025-07-02 NOTE — PLAN OF CARE
Problem: Postpartum (Vaginal Delivery)  Goal: Hemostasis  Outcome: Progressing     Problem: Postpartum (Vaginal Delivery)  Goal: Optimal Pain Control and Function  Outcome: Progressing     Problem: Postpartum (Vaginal Delivery)  Goal: Successful Parent Role Transition  Outcome: Progressing    Goal Outcome Evaluation:      Plan of Care Reviewed With: patient, spouse    Overall Patient Progress: improvingOverall Patient Progress: improving    Outcome Evaluation: Patient able to make needs known. VSS. Denies of any pre-eclampsia symptoms. Delivered  at 0248. Vaginal tear that was repaired. Toradol was given for pain per patient request. Plans to breastfeed and supplement with formula.      Normal postpartum cares.

## 2025-07-02 NOTE — PLAN OF CARE
Problem: Labor  Goal: Hemostasis  Outcome: Progressing     Problem: Labor  Goal: Acceptable Pain Control  Outcome: Progressing     Problem: Labor  Goal: Normal Uterine Contraction Pattern  Outcome: Progressing    Goal Outcome Evaluation:      Plan of Care Reviewed With: patient, spouse    Overall Patient Progress: improvingOverall Patient Progress: improving    Outcome Evaluation: Patient VSS. Denies of any headaches, vision changes, or pain in upper right abdomen. Ambulating from bed to bathroom independently. Coping well through contractions. Discussed labor pain management. Patient declines at this time and will let RN know if patient changes her mind. Patietn reports feeling contraction pain in lower back.  is at bedside and supportive and putting counter pressure in lower back during contractions. Category 1 fetal heart tracing with moderate variability and accels. Plan is to continue to progress in labor to delivery.

## 2025-07-02 NOTE — H&P
Maternal Admission H&P  [facility]  Date of Admission: 2025  Date of Service: 2025    Name      Yefri Rodriguez         2000  MRN       [id number]  PCP        Omaira Allan at [facility], [phone].    ________________________________________________________________________    Assessment and Plan:  25 year old  at 38w0d.  Baby AGA. Anticipate .  Group B strep: negative  History of ESBL with multiple negative urine cultures since that time.  Will try to remove contact precautions.    ________________________________________________________________________    Chief Complaint: active labor management.    HPI: Yefri Rodriguez is a 25 year old woman at 38w0d, based on an Estimated Date of Delivery: 2025. She presents with complaints of contractions that started at 10pm last night.  No lof.  No bleeding.  Pregnancy has been uncomplicated.  She is GBS negative.      Patient Active Problem List    Diagnosis Date Noted    Pregnancy 2025     Priority: Medium     (normal spontaneous vaginal delivery) 2023     Priority: Medium    Encounter for triage in pregnant patient 2023     Priority: Medium      OB History    Para Term  AB Living   2 1 1 0 0 1   SAB IAB Ectopic Multiple Live Births   0 0 0 0 1      # Outcome Date GA Lbr Grey/2nd Weight Sex Type Anes PTL Lv   2 Current            1 Term 23 39w4d 03:09 / 00:46 3.03 kg (6 lb 10.9 oz) F Vag-Spont None N EMILIANO      Name: EDWARD,FEMALE-YEFRI ALFARO      Apgar1: 8  Apgar5: 9     Review of Systems Negative except what is noted in HPI.   History reviewed. No pertinent past medical history.   History reviewed. No pertinent surgical history.Patient has no known allergies.  History reviewed. No pertinent family history.  Social History     Socioeconomic History    Marital status:      Spouse name: Not on file    Number of children: Not on file    Years of education:  Not on file    Highest education level: Not on file   Occupational History    Not on file   Tobacco Use    Smoking status: Never     Passive exposure: Never    Smokeless tobacco: Never   Substance and Sexual Activity    Alcohol use: Not on file    Drug use: Not on file    Sexual activity: Not on file   Other Topics Concern    Not on file   Social History Narrative    ** Merged History Encounter **          Social Drivers of Health     Financial Resource Strain: Low Risk  (7/2/2025)    Financial Resource Strain     Within the past 12 months, have you or your family members you live with been unable to get utilities (heat, electricity) when it was really needed?: No   Food Insecurity: Low Risk  (7/2/2025)    Food Insecurity     Within the past 12 months, did you worry that your food would run out before you got money to buy more?: No     Within the past 12 months, did the food you bought just not last and you didn t have money to get more?: No   Transportation Needs: Low Risk  (7/2/2025)    Transportation Needs     Within the past 12 months, has lack of transportation kept you from medical appointments, getting your medicines, non-medical meetings or appointments, work, or from getting things that you need?: No   Physical Activity: Not on file   Stress: Not on file   Social Connections: Not on file   Interpersonal Safety: Low Risk  (7/2/2025)    Interpersonal Safety     Do you feel physically and emotionally safe where you currently live?: Yes     Within the past 12 months, have you been hit, slapped, kicked or otherwise physically hurt by someone?: No     Within the past 12 months, have you been humiliated or emotionally abused in other ways by your partner or ex-partner?: No   Housing Stability: Low Risk  (7/2/2025)    Housing Stability     Do you have housing? : Yes     Are you worried about losing your housing?: No     Prior to Admission Medication List  Medications Prior to Admission   Medication Sig Dispense  "Refill Last Dose/Taking    acetaminophen (TYLENOL) 500 MG tablet Take 500 mg by mouth every 6 hours as needed.   More than a month    docusate sodium (COLACE) 100 MG capsule Take 1 capsule (100 mg) by mouth daily 10 capsule 0 More than a month    ibuprofen (ADVIL/MOTRIN) 800 MG tablet Take 1 tablet (800 mg) by mouth every 6 hours as needed for other (cramping) 30 tablet 0 More than a month    Prenatal Vit-Fe Fumarate-FA (PRENATAL MULTIVITAMIN W/IRON) 27-0.8 MG tablet Take 1 tablet by mouth daily.   More than a month      Allergies  No Known Allergies  Immunization History   Administered Date(s) Administered    COVID-19 Monovalent 18+ (Moderna) 08/02/2021, 08/30/2021      Physical Exam  Patient Vitals for the past 24 hrs:   BP Temp Temp src Resp SpO2 Height Weight   07/01/25 2356 118/80 98.1  F (36.7  C) Oral 18 97 % 1.499 m (4' 11\") 64.9 kg (143 lb)     Wt Readings from Last 1 Encounters:   07/01/25 64.9 kg (143 lb)   Prepregnancy: Pregravid weight not on file, BMI Could not be calculated. Total gain: Not found. (expected gain: Could not be calculated).    HEART: RRR, no murmur  LUNGS: CTA bilaterally  Fetal Heart Tones: Baseline 120 bpm, variability moderate (6-25 bpm), no decelerations. Reactive.  CONTRACTIONS:  regular, every 2-3 minutes.  CERVIX: dilation 3 cm , 80% effaced, soft, and -2 station.  FLUID: None noted.  Fetal Presentation: vertex.  Labs    Group B Strep PCR   Date Value Ref Range Status   06/24/2025 Negative Negative Final     Comment:     Presumed negative for Streptococcus agalactiae (Group B Streptococcus) or the number of organisms may be below the limit of detection of the assay.      Antibody Screen   Date Value Ref Range Status   12/27/2024 Negative Negative Final     Hepatitis B Surface Antigen   Date Value Ref Range Status   11/22/2024 Nonreactive Nonreactive Final     Hemoglobin   Date Value Ref Range Status   07/02/2025 12.7 11.7 - 15.7 g/dL Final      Admission on 07/01/2025   Component " Date Value Ref Range Status    WBC Count 07/02/2025 12.7 (H)  4.0 - 11.0 10e3/uL Final    RBC Count 07/02/2025 4.15  3.80 - 5.20 10e6/uL Final    Hemoglobin 07/02/2025 12.7  11.7 - 15.7 g/dL Final    Hematocrit 07/02/2025 38.4  35.0 - 47.0 % Final    MCV 07/02/2025 93  78 - 100 fL Final    MCH 07/02/2025 30.6  26.5 - 33.0 pg Final    MCHC 07/02/2025 33.1  31.5 - 36.5 g/dL Final    RDW 07/02/2025 13.5  10.0 - 15.0 % Final    Platelet Count 07/02/2025 246  150 - 450 10e3/uL Final    ABO/RH(D) 07/02/2025 O POS   Preliminary    SPECIMEN EXPIRATION DATE 07/02/2025 7/5/2025 11:59:00 PM CDT   Preliminary        Completed by:   Omaira Allan MD    7/2/2025 1:01 AM   used: Not needed.

## 2025-07-02 NOTE — PROGRESS NOTES
"Postpartum Progress Note  [facility]  Date of Service: 2025    Name      Yefri Li Saw         2000  MRN       [id number]  PCP        Omaira Allan        Subjective:  The patient feels well:  Voiding with pain but no difficulty, lochia normal.  Pain is well controlled with current medications.  The patient has no emotional concerns.  No calf pain or swelling.  The baby is well and being fed by breastfeeding and formula.    Needle poke during laceration repair - infectious disease panel collected. Initial HIV testing reactive with negative P24 antigen testing. Final testing negative. While waiting for final interpretation, patient was advised to not direct breast feed. Now that final testing negative, discussed with patient that she can return to breastfeeding.     Objective:  BP 92/61 (BP Location: Left arm, Patient Position: Semi-Cordero's, Cuff Size: Adult Regular)   Pulse 79   Temp 97.6  F (36.4  C) (Oral)   Resp 17   Ht 1.499 m (4' 11\")   Wt 64.9 kg (143 lb)   LMP  (LMP Unknown)   SpO2 98%   Breastfeeding Unknown   BMI 28.88 kg/m    General Appearance: Lying in bed. NAD.  HEENT: sclera non-injected and non-icteric  Respiratory: Breathing comfortably on room air. No increased work of breathing. Breath sounds present throughout lung fields. No wheezes, rhonchi, or crackles.  Cardiovascular: Regular rate and rhythm. No murmurs.  GI: The uterine fundus is firm at umbilicus.     Extremities: No peripheral edema in the bilateral lower extremities  Skin: no lesions visible  Neuro: alert and oriented      Labs:  Hemoglobin   Date Value Ref Range Status   2025 12.3 11.7 - 15.7 g/dL Final       Assessment:    -Postpartum Day 1 s/p vaginal delivery  -Normal postpartum course.  -Screening HIV due to needle poke with initial positive, final result negative    Plan:    -Continue current care.      Completed by:   Rose Hall MD, M.D.  [facility] " [facility]  7/2/2025 9:58 AM

## 2025-07-02 NOTE — PLAN OF CARE
Yefri Li's vital and maternal assessment WDL. Pain adequately managed with Tylenol and Ibuprofen, per patient preference. Perineal discomfort managed with Tucks, Dermoplast and karol bottle. Up independently, voiding without difficultly and caring for self and infant. Yefri Li plans to breast and formula feed her infant, but has chosen to formula feed on this shift, breastfeeding support offered. Yefri Li and her supportive spouse Cuate are attentive to infant needs/cues, asking appropriate questions and hold infant frequently. Positive attachment behaviors observed.     Problem: Adult Inpatient Plan of Care  Goal: Plan of Care Review  Description: The Plan of Care Review/Shift note should be completed every shift.  The Outcome Evaluation is a brief statement about your assessment that the patient is improving, declining, or no change.  This information will be displayed automatically on your shift  note.  7/2/2025 1727 by Sadaf Varela RN  Outcome: Progressing  Flowsheets (Taken 7/2/2025 1727)  Plan of Care Reviewed With:   patient   spouse  Overall Patient Progress: improving  7/2/2025 1726 by Sadaf Varela RN  Outcome: Progressing  Flowsheets (Taken 7/2/2025 1726)  Plan of Care Reviewed With:   patient   spouse  Overall Patient Progress: improving     Problem: Postpartum (Vaginal Delivery)  Goal: Optimal Pain Control and Function  7/2/2025 1727 by Sadaf Varela RN  Outcome: Progressing  7/2/2025 1726 by Sadaf Varela RN  Outcome: Progressing  Intervention: Prevent or Manage Pain  Recent Flowsheet Documentation  Taken 7/2/2025 1541 by Sadaf Varela RN  Perineal Care:   absorbent brief/pad changed   perineal spray bottle/warm water use encouraged   perineum cleansed   perineal hygiene encouraged  Taken 7/2/2025 1445 by Sadaf Varela, RN  Pain Management Interventions:   care clustered   heat applied   quiet environment facilitated   rest   medication (see MAR)  Taken 7/2/2025 1429 by  Sadaf Varela, RN  Pain Management Interventions:   care clustered   heat applied   quiet environment facilitated   rest   medication (see MAR)  Taken 7/2/2025 0945 by Sadaf Varela, RN  Pain Management Interventions:   care clustered   heat applied   quiet environment facilitated   rest  Taken 7/2/2025 0856 by Sadaf Varela, RN  Pain Management Interventions: medication (see MAR)  Taken 7/2/2025 0836 by Sadaf Varela, RN  Pain Management Interventions:   care clustered   heat applied   rest   quiet environment facilitated  Perineal Care:   absorbent brief/pad changed   perineal spray bottle/warm water use encouraged   perineum cleansed   perineal hygiene encouraged   Goal Outcome Evaluation:      Plan of Care Reviewed With: patient, spouse    Overall Patient Progress: improvingOverall Patient Progress: improving

## 2025-07-02 NOTE — PROVIDER NOTIFICATION
07/02/25 0008   Provider Notification   Provider Name/Title Jerrell   Method of Notification Phone   Request Evaluate - Remote   Notification Reason Patient Arrived;Membrane Status;Labor Status;Uterine Activity;Pain;Status Update;SVE     Dr. Omaira Allan informed of patient arrival and assessment including the following:    Reason for maternal/fetal assessment uterine contractions. Pt reports contractions starting around 10 pm. Fetal status normal baseline, moderate variability, accelerations present and no decelerations. Plan per provider/orders received for admission and MD en-route to hospital.

## 2025-07-02 NOTE — PROVIDER NOTIFICATION
07/02/25 0826   Provider Notification   Provider Name/Title Dr Hall   Method of Notification Phone   Request Evaluate-Remote   Notification Reason Lab Results  (Spoke to Dr Hall regarding patients reactive lab result. Due to the result she would like the patient to hold off on breastfeeding until the remainer of the labs result.)

## 2025-07-02 NOTE — L&D DELIVERY NOTE
OB Vaginal Delivery Note    Yefri Li Saw MRN# [id number]   Age: 25 year old YOB: 2000       GA: 38w0d  GP:   Labor Complications: None  EBL:   mL  Delivery QBL:    Delivery Type: Vaginal, Spontaneous  ROM to Delivery Time: (Delivered) Minutes: 33  [place] Weight:     1 Minute 5 Minute 10 Minute   Apgar Totals:            JOZEF SIMON;PK LO    Delivery Details:  Yefri Li Saw, a 25 year old  female delivered a viable infant with apgars of   and  . Patient was fully dilated and pushing after   hours   minutes in active labor.   She arrived at 3cm, progressed quickly to 6cm, was ruptured, and shortly after that felt a strong urge to push.  She was found to be 9cm, and the baby moved down, she then crowned over 2 contractions . Baby restituted from SYLVESTER to GET.  Delivery was via vaginal, spontaneous to a sterile field under   anesthesia. Infant delivered in vertex left occiput anterior position. Anterior and posterior shoulders delivered without difficulty. The cord was clamped, cut twice and 3 vessels were noted. Cord blood was obtained in routine fashion with the following disposition: lab.      Cord complications: none  Placenta delivered at 2025  2:51 AM. Placental disposition was Hospital disposal. Fundal massage performed and fundus found to be firm.     Episiotomy: none   Perineum, vagina, cervix were inspected, and the following lacerations were noted:   Perineal lacerations: none        vaginal laceration noted    that was bleeding.      Any lacerations were repaired in the usual fashion using 4-0 vicryl due to bleeding.  .    Excellent hemostasis was noted. Needle count correct. Infant and patient in delivery room in good and stable condition.        Saw, Female-Yefri Li [[id number]]      Labor Event Times      Active labor onset date: 25 Onset time:  2:15 AM          Labor Events     labor?: No   steroids: None  Labor Type: Spontaneous  Predominate  monitoring during 1st stage: continuous electronic fetal monitoring     Antibiotics received during labor?: No       Rupture date/time: 7/2/25 02:15   Rupture type: Artificial Rupture of Membranes  Fluid color: Clear  Fluid odor: Normal     Augmentation: AROM       Delivery/Placenta Date and Time      Delivery Date: 7/2/25 Delivery Time:  2:48 AM   Placenta Date/Time: 7/2/2025  2:51 AM  Oxytocin given at the time of delivery: after delivery of baby  Delivering clinician: Omaira Allan MD   Other personnel present at delivery:  Provider Role   Lauren Blair RN Gruber, Ashley M, RN              Vaginal Counts       Initial count performed by 2 team members:  Two Team Members   Jerrell Blair         Needles Suture Needles Sponges (RETIRED) Instruments   Initial counts   5    Added to count 2 1     Relief counts       Final counts               Placed during labor Accounted for at the end of labor   FSE NA NA   IUPC NA NA   Cervidil NA NA                  Final count performed by 2 team members:  Two Team Members   Jerrell Blair RN             Cord      Vessels: 3 Vessels    Cord Complications: None               Cord Blood Disposition: Lab    Gases Sent?: No    Delayed cord clamping?: Yes    Cord Clamping Delay (seconds): >120 seconds    Stem cell collection?: No           Labor Events and Shoulder Dystocia    Fetal Tracing Prior to Delivery: Category 1  Shoulder dystocia present?: Neg       Delivery (Maternal) (Provider to Complete) (660991)    Episiotomy: None  Perineal lacerations: None      Vaginal laceration?: Yes Repaired?: Yes   Repair suture: 4-0 Vicryl  Number of repair packets: 1  Genital tract inspection done: Pos       Blood Loss  Mother: Michael Yefri Li #[id number]     Start of Mother's Information      Delivery Blood Loss   Intrapartum & Postpartum: 07/02/25 0215 - 07/02/25 0329    Delivery Admission: 07/01/25 2339 - 07/02/25 0329         Intrapartum & Postpartum Delivery  Admission    None                  End of Mother's Information  Mother: Yefri Rodriguez Po #[id number]                Delivery - Provider to Complete ([id number])    Delivering clinician: Omaira Allan MD  Delivery Type (Choose the 1 that will go to the Birth History): Vaginal, Spontaneous                         Other personnel:  Provider Role   Lauren Blair, Sophia Bishop RN                     Placenta    Date/Time: 7/2/2025  2:51 AM  Removal: Spontaneous  Disposition: Hospital disposal             Presentation and Position    Presentation: Vertex    Position: Left Occiput Anterior                     Omaira Allan MD

## 2025-07-02 NOTE — PROGRESS NOTES
Data: Yefri Po Saw transferred to [facility]/AXLT09-4 via ambulation. Patient transferred to Postpartum with .    Action: Receiving unit notified of transfer. Patient and support person notified of room change. Patient and belongings accompanied by Registered Nurse. Bedside report given to Sophia Newsome RN. Fundal check performed with receiving RN. Oriented patient to surroundings. Call light within reach.All L&D items on Birth Record Medical Information form have been completed or marked unknown and given to PP RN caring for     Response: Patient tolerated transfer.    Lauren Blair RN  2025 5:06 AM

## 2025-07-02 NOTE — PROGRESS NOTES
Data: Yefri Li Saw a 25 year old  38w0d presented to [facility]: 2025 11:39 PM. Patient admitted for Active Labor. Prenatal record reviewed. Pregnancy has been uncomplicated..    Gestational Age 38w0d. VSS. Fetal movement present. Patient denies uterine contractions. Support person is present.    Action: Verbal consent for EFM.  Admission assessment completed. Bill of rights reviewed.    Response: Patient verbalized agreement with plan. Will contact Dr Omaira Allan with update and further orders.    Lauren Blair RN  2025 12:56 AM

## 2025-07-02 NOTE — PLAN OF CARE
D:  Patient admitted to [facility]/VVDS90-5 via wheelchair with  and support person Cuate.  A:  Bedside report received from Lauren ALFARO RN. Oriented patient and family to surroundings; call light within reach. 4 Part ID bands double checked with reporting RN.  R:  Patient and  tolerated transfer. Care assumed.           Goal Outcome Evaluation:      Plan of Care Reviewed With: patient, spouse        Sophia Newsome, RN on 2025 at 5:20 AM

## 2025-07-02 NOTE — PROGRESS NOTES
Data: Patient presented to [facility]: 2025 11:39 PM.  Reason for maternal/fetal assessment is uterine contractions. Patient reports contractions started at 10pm. Patient denies leaking of vaginal fluid/rupture of membranes, vaginal bleeding, abdominal pain, pelvic pressure, nausea, vomiting, headache, visual disturbances, epigastric or RUQ pain, significant edema. Patient reports fetal movement is normal. Patient is a 38w0d .  Prenatal record reviewed. Pregnancy has been uncomplicated.    Vital signs wnl. Support person is present.     Action: Verbal consent for EFM. Triage assessment completed.     Response: Patient verbalized agreement with plan. Will contact Dr. Allan with update and further orders.

## 2025-07-02 NOTE — PROGRESS NOTES
Infection Prevention Progress Note  7/2/2025      Patient Name: Yefri Rodriguez [id number]  Admit Date: 7/1/2025    Infection Status as of 7/2/2025 2:41 PM: ESBL  Isolation Status as of 7/2/2025 2:41 PM: Contact     MDRO Discontinuation  Infection Prevention has reviewed this patient's chart per the MDRO D/C Policy and have taken the following action:    The patient does not qualify for discontinuation as patient does not have the required negative results. Stool cultures are required and outline in this policy: https://[facility]/docview/?john=pt&source=unspecified&xvvbz=76187&anonymous=true    Contact Precautions maintained for the following MDRO(s): ESBL    If you have any questions, please contact Infection Prevention.    Elena Gates, Infection Prevention

## 2025-07-03 VITALS
WEIGHT: 139.6 LBS | OXYGEN SATURATION: 96 % | DIASTOLIC BLOOD PRESSURE: 71 MMHG | BODY MASS INDEX: 28.14 KG/M2 | SYSTOLIC BLOOD PRESSURE: 99 MMHG | HEART RATE: 75 BPM | HEIGHT: 59 IN | TEMPERATURE: 97.5 F | RESPIRATION RATE: 16 BRPM

## 2025-07-03 LAB — HCV RNA SERPL NAA+PROBE-ACNC: NOT DETECTED IU/ML

## 2025-07-03 PROCEDURE — 250N000013 HC RX MED GY IP 250 OP 250 PS 637: Performed by: FAMILY MEDICINE

## 2025-07-03 RX ADMIN — METHYLCELLULOSE 1000 MG: 500 TABLET ORAL at 08:51

## 2025-07-03 ASSESSMENT — ACTIVITIES OF DAILY LIVING (ADL)
ADLS_ACUITY_SCORE: 26
ADLS_ACUITY_SCORE: 30
ADLS_ACUITY_SCORE: 26
ADLS_ACUITY_SCORE: 26
ADLS_ACUITY_SCORE: 30
ADLS_ACUITY_SCORE: 26

## 2025-07-03 NOTE — DISCHARGE SUMMARY
OB Discharge Summary  [facility]  Date of Service: 7/3/2025    Name      Yefri Rodriguez         2000  MRN       [id number]  PCP        Omaira Allan    Admit Date:  2025  Discharge Date:  7/3/2025    Principal Diagnosis:    Patient Active Problem List   Diagnosis     (normal spontaneous vaginal delivery)    Encounter for triage in pregnant patient    Pregnancy    Major depressive disorder with single episode, in full remission    Gastroesophageal reflux disease    Constipation       Delivery Type: vaginal, spontaneous    Hospital Course:  Yefri Rodriguez is a 25 year old now  s/p vaginal, spontaneous at 38w0d on 25.  The patient's hospital course was unremarkable.  She recovered as anticipated and experienced no post-delivery complications.  On discharge, her pain was well controlled.  Vaginal bleeding is normal.  Voiding without difficulty.  Ambulating well and tolerating a normal diet.  No fevers.       Conditions complicating Pregnancy:  - none    Procedure(s) Performed: none    Discharge Plan:   Discharge to Home. Condition at Discharge:  stable  Physical activity: Regular.  Diet:  Regular.  Home care nurse: not indicated.  Contraception plan: undecided, will follow up at postpartum visit.  Follow up with Omaira Salinas in 6 weeks.    Discharge Medications:   Current Discharge Medication List        CONTINUE these medications which have NOT CHANGED    Details   acetaminophen (TYLENOL) 500 MG tablet Take 500 mg by mouth every 6 hours as needed.      docusate sodium (COLACE) 100 MG capsule Take 1 capsule (100 mg) by mouth daily  Qty: 10 capsule, Refills: 0    Associated Diagnoses:  (normal spontaneous vaginal delivery)      ibuprofen (ADVIL/MOTRIN) 800 MG tablet Take 1 tablet (800 mg) by mouth every 6 hours as needed for other (cramping)  Qty: 30 tablet, Refills: 0    Associated Diagnoses:  (normal spontaneous vaginal delivery)      Prenatal Vit-Fe  "Fumarate-FA (PRENATAL MULTIVITAMIN W/IRON) 27-0.8 MG tablet Take 1 tablet by mouth daily.             Allergies: No Known Allergies    Discharge Exam:  BP 99/71 (BP Location: Left arm, Patient Position: Semi-Cordero's, Cuff Size: Adult Regular)   Pulse 75   Temp 97.5  F (36.4  C) (Oral)   Resp 16   Ht 1.499 m (4' 11\")   Wt 64.9 kg (143 lb)   LMP  (LMP Unknown)   SpO2 96%   Breastfeeding Unknown   BMI 28.88 kg/m    General Appearance: NAD.  HEENT: sclera non-injected and non-icteric  Respiratory: Breathing comfortably on room air.   Cardiovascular: Regular rate  GI: fundus firm at umbilicus  Extremities: No peripheral edema in the bilateral lower extremities  Skin: no lesions visible  Neuro: alert and oriented      Post-partum hemoglobin:   Hemoglobin   Date Value Ref Range Status   07/02/2025 12.3 11.7 - 15.7 g/dL Final       Greater than 30 minutes were spent on this discharge on the day of service.      Completed by:   Rose Hall MD  [facility]  7/3/2025 7:50 AM   "

## 2025-07-03 NOTE — PLAN OF CARE
Yefri Li's vital and maternal assessment WDL. Pain adequately managed without the use of PO pain medications on this shift. Perineal discomfort managed with Tucks, Dermoplast and karol bottle. Up independently, voiding without difficultly and caring for self and infant. Formula feeding while in the hospital, plans to breast feed at home and decline lactation while here. Yefri Li and her supportive spouse Cuate are attentive to infant needs/cues, asking appropriate questions and hold infant frequently. Positive attachment behaviors observed.     Yefri Li has met the criteria for discharge. Education completed on warning signs, when to call provider, safe medication use and management, follow up appointments, and home safety. All questions answers, verbalized understanding and no further questions at this time.     D:  Patient desires discharge home.  Discharge orders received and entered by provider.  A:  Discharge instructions reviewed with the patient.  All questions and concerns addressed.  R:  Discharge criteria met.  4 Part ID bands double checked.   discharged in car seat with parents.  The nurse escorted patient to car .     Problem: Adult Inpatient Plan of Care  Goal: Plan of Care Review  Description: The Plan of Care Review/Shift note should be completed every shift.  The Outcome Evaluation is a brief statement about your assessment that the patient is improving, declining, or no change.  This information will be displayed automatically on your shift  note.  Outcome: Adequate for Care Transition  Flowsheets (Taken 7/3/2025 114)  Plan of Care Reviewed With:   patient   spouse  Overall Patient Progress: improving   Goal Outcome Evaluation:      Plan of Care Reviewed With: patient, spouse    Overall Patient Progress: improvingOverall Patient Progress: improving

## 2025-07-03 NOTE — DISCHARGE INSTRUCTIONS
Warning Signs after Having a Baby    Keep this paper on your fridge or somewhere else where you can see it.    Call your provider if you have any of these symptoms up to 12 weeks after having your baby.    Thoughts of hurting yourself or your baby  Pain in your chest or trouble breathing  Severe headache not helped by pain medicine  Eyesight concerns (blurry vision, seeing spots or flashes of light, other changes to eyesight)  Fainting, shaking or other signs of a seizure    Call 9-1-1 if you feel that it is an emergency.     The symptoms below can happen to anyone after giving birth. They can be very serious. Call your provider if you have any of these warning signs.    My provider s phone number: _______________________    Losing too much blood (hemorrhage)    Call your provider if you soak through a pad in less than an hour or pass blood clots bigger than a golf ball. These may be signs that you are bleeding too much.    Blood clots in the legs or lungs    After you give birth, your body naturally clots its blood to help prevent blood loss. Sometimes this increased clotting can happen in other areas of the body, like the legs or lungs. This can block your blood flow and be very dangerous.     Call your provider if you:  Have a red, swollen spot on the back of your leg that is warm or painful when you touch it.   Are coughing up blood.     Infection    Call your provider if you have any of these symptoms:  Fever of 100.4 F (38 C) or higher.  Pain or redness around your stitches if you had an incision.   Any yellow, white, or green fluid coming from places where you had stitches or surgery.    Mood Problems (postpartum depression)    Many people feel sad or have mood changes after having a baby. But for some people, these mood swings are worse.     Call your provider right away if you feel so anxious or nervous that you can't care for yourself or your baby.    Preeclampsia (high blood pressure)    Even if you  didn't have high blood pressure when you were pregnant, you are at risk for the high blood pressure disease called preeclampsia. This risk can last up to 12 weeks after giving birth.     Call your provider if you have:   Pain on your right side under your rib cage  Sudden swelling in the hands and face    Remember: You know your body. If something doesn't feel right, get medical help.     For informational purposes only. Not to replace the advice of your health care provider. Copyright 2020 [facility]. All rights reserved. Clinically reviewed by Janny Mcneal, RNC-OB, MSN. [employer] [id number] - Rev 02/23.

## 2025-07-03 NOTE — PLAN OF CARE
Problem: Postpartum (Vaginal Delivery)  Goal: Successful Parent Role Transition  Outcome: Progressing  Intervention: Support Parent Role Transition  Recent Flowsheet Documentation  Taken 7/3/2025 0000 by Brenda Bravo RN  Supportive Measures:   active listening utilized   goal-setting facilitated  Parent-Child Attachment Promotion: caring behavior modeled  Taken 7/2/2025 1938 by Brenda Bravo RN  Supportive Measures:   active listening utilized   goal-setting facilitated  Parent-Child Attachment Promotion: caring behavior modeled  Goal: Hemostasis  Outcome: Progressing  Goal: Absence of Infection Signs and Symptoms  Outcome: Progressing  Goal: Optimal Pain Control and Function  Outcome: Progressing  Intervention: Prevent or Manage Pain  Recent Flowsheet Documentation  Taken 7/2/2025 2355 by Brenda Bravo RN  Pain Management Interventions: medication (see MAR)  Taken 7/2/2025 2143 by Brenda Bravo RN  Pain Management Interventions:   medication (see MAR)   heat applied  Taken 7/2/2025 1938 by Brenda Bravo RN  Pain Management Interventions:   heat applied   rest  Goal: Effective Urinary Elimination  Outcome: Progressing   Goal Outcome Evaluation:  Data: Vital signs within normal limits. Postpartum checks within normal limits - see flow record. Patient eating and drinking normally. Patient able to empty bladder independently and is up ambulating. No apparent signs of infection. Vaginal laceration healing well. Patient performing self cares and is able to care for infant.  Action: Patient medicated with Ibuprofen and Tylenol during the shift for cramping and soreness. See MAR. Patient reassessed within 1 hour after each medication and pain was improved.    Response: Positive attachment behaviors observed with infant. Support persons spouse present.   Plan: Continue to monitor postpartum recovery.